# Patient Record
Sex: MALE | Race: WHITE
[De-identification: names, ages, dates, MRNs, and addresses within clinical notes are randomized per-mention and may not be internally consistent; named-entity substitution may affect disease eponyms.]

---

## 2021-11-18 ENCOUNTER — HOSPITAL ENCOUNTER (EMERGENCY)
Dept: HOSPITAL 97 - ER | Age: 81
Discharge: HOME | End: 2021-11-18
Payer: COMMERCIAL

## 2021-11-18 VITALS — DIASTOLIC BLOOD PRESSURE: 66 MMHG | OXYGEN SATURATION: 94 % | SYSTOLIC BLOOD PRESSURE: 128 MMHG | TEMPERATURE: 98.2 F

## 2021-11-18 DIAGNOSIS — F17.210: ICD-10-CM
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
## 2021-11-18 DIAGNOSIS — S42.202A: Primary | ICD-10-CM

## 2021-11-18 PROCEDURE — 99284 EMERGENCY DEPT VISIT MOD MDM: CPT

## 2021-11-18 NOTE — EDPHYS
Physician Documentation                                                                           

 [facility]                                                                 

Name: Rodney Xiao                                                                                

Age: 81 yrs                                                                                       

Sex: Male                                                                                         

: 1940                                                                                   

MRN: [id number]                                                                                   

Arrival Date: 2021                                                                          

Time: 09:52                                                                                       

Account#: [id number]                                                                            

Bed 20                                                                                            

Private MD:                                                                                       

ED Physician Poncho Lui                                                                       

HPI:                                                                                              

                                                                                             

10:52 This 81 yrs old Male presents to ER via EMS with complaints of Arm pain.                jr8 

10:52 This is an 81-year-old male patient that presented to the emergency room via EMS after  jr8 

      sustaining [place] fall on a cruise [place]. Patient was evaluated initially on the cruise ship      

      and had imaging done confirming that he did have a proximal humeral fracture. Was           

      splinted at that time and flown back to [place]. Patient came to emergency room today     

      for further evaluation..                                                                    

                                                                                                  

Historical:                                                                                       

- Allergies:                                                                                      

10:45 No Known Allergies;                                                                     sl2 

- PMHx:                                                                                           

10:45 Anxiety; CVA; Depression; Hyperlipidemia;                                               sl2 

                                                                                                  

- Immunization history:: Adult Immunizations up to date, Client reports receiving the             

  2nd dose of the Covid vaccine.                                                                  

- Social history:: Smoking status: Patient reports the use of cigarette tobacco                   

  products, smokes one-half pack cigarettes per day.                                              

                                                                                                  

                                                                                                  

ROS:                                                                                              

10:52 Eyes: Negative for injury, pain, redness, and discharge, ENT: Negative for injury,      jr8 

      pain, and discharge, Neck: Negative for injury, pain, and swelling, Cardiovascular:         

      Negative for chest pain, palpitations, and edema, Respiratory: Negative for shortness       

      of breath, cough, wheezing, and pleuritic chest pain, Abdomen/GI: Negative for              

      abdominal pain, nausea, vomiting, diarrhea, and constipation, Back: Negative for injury     

      and pain, Skin: Negative for injury, rash, and discoloration, Neuro: Negative for           

      headache, weakness, numbness, tingling, and seizure.                                        

10:52 MS/extremity: Positive for decreased range of motion, pain, tenderness, of the left arm.    

                                                                                                  

Exam:                                                                                             

10:52 Constitutional:  This is a well developed, well nourished patient who is awake, alert,  jr8 

      and in no acute distress. Head/Face:  Normocephalic, atraumatic. Neck:  Trachea             

      midline, no thyromegaly or masses palpated, and no cervical lymphadenopathy.  Supple,       

      full range of motion without nuchal rigidity, or vertebral point tenderness.  No            

      Meningismus. Chest/axilla:  Normal chest wall appearance and motion.  Nontender with no     

      deformity.  No lesions are appreciated. Cardiovascular:  Regular rate and rhythm with a     

      normal S1 and S2.  No gallops, murmurs, or rubs.  Normal PMI, no JVD.  No pulse             

      deficits. Respiratory:  Lungs have equal breath sounds bilaterally, clear to                

      auscultation and percussion.  No rales, rhonchi or wheezes noted.  No increased work of     

      breathing, no retractions or nasal flaring. Abdomen/GI:  Soft, non-tender, with normal      

      bowel sounds.  No distension or tympany.  No guarding or rebound.  No evidence of           

      tenderness throughout. Back:  No spinal tenderness.  No costovertebral tenderness.          

      Full range of motion. Skin:  Warm, dry with normal turgor.  Normal color with no            

      rashes, no lesions, and no evidence of cellulitis. Neuro:  Awake and alert, GCS 15,         

      oriented to person, place, time, and situation.  Cranial nerves II-XII grossly intact.      

      Motor strength 5/5 in all extremities.  Sensory grossly intact.                             

10:52 Musculoskeletal/extremity: Extremities: grossly normal except: noted in the left arm:       

      She has moderate tenderness to the proximal humeral region without obvious deformity.       

      No other external signs of trauma.  Patient remains in a coaptation splint.  Patient is     

      able to dorsiflex wrist and squeeze with his left hand.  Sensation intact throughout        

      with 2+ radial pulses on affected extremity..                                               

                                                                                                  

Vital Signs:                                                                                      

09:46  / 64; Pulse 74; Resp 18; Temp 98.1; Pulse Ox 88% on R/A;                         sl2 

11:00  / 76; Pulse 75; Resp 18; Temp 98.1; Pulse Ox 91% on 2 lpm NC;                    sl2 

12:00  / 66; Pulse 76; Resp 18; Temp 98.2; Pulse Ox 94% on 2 lpm NC;                    sl2 

                                                                                                  

MDM:                                                                                              

09:55 Patient medically screened.                                                             jr8 

10:52 Data reviewed: vital signs, nurses notes, radiologic studies, plain films. Data         jr8 

      interpreted: Pulse oximetry: on 2L(s) per nasal canula, is 96 %. Interpretation:            

      acceptable. Counseling: I had a detailed discussion with the patient and/or guardian        

      regarding: the historical points, exam findings, and any diagnostic results supporting      

      the discharge/admit diagnosis, radiology results, the need for outpatient follow up, a      

      orthopedic surgeon, to return to the emergency department if symptoms worsen or persist     

      or if there are any questions or concerns that arise at home. ED course: Discussed with     

      patient that he needs to remain in the coaptation splint. No focal deficits                 

      neurologically at this time to indicate need for reduction or immediate surgery. Will       

      refer him to orthopedics for further evaluation. Patient good with this at this time.       

      Patient knows to come back if he were to worsen at any point time..                         

                                                                                                  

                                                                                             

10:02 Order name: XRAY Shoulder LEFT 2 view; Complete Time: 11:23                             jr8 

                                                                                                  

Administered Medications:                                                                         

10:09 Drug: Norco (HYDROcodone-acetaminophen) (7.5 mg-325 mg) 1 tabs Route: PO;               sl2 

11:15 Follow up: Response: No adverse reaction; Pain is decreased                             sl2 

12:30 Drug: Norco (HYDROcodone-acetaminophen) (7.5 mg-325 mg) 1 tabs Route: PO;               sl2 

12:32 Follow up: Response: No adverse reaction                                                sl2 

                                                                                                  

                                                                                                  

Disposition:                                                                                      

17:24 Co-signature as Attending Physician, Poncho Lui MD I agree with the assessment and   kdr 

      plan of care.                                                                               

                                                                                                  

Disposition Summary:                                                                              

21 11:03                                                                                    

Discharge Ordered                                                                                 

      Location: Home                                                                          jr 

      Problem: new                                                                            jr8 

      Symptoms: have improved                                                                 jr8 

      Condition: Stable                                                                       jr8 

      Diagnosis                                                                                   

        - Fracture of upper end of humerus                                                    jr8 

      Followup:                                                                               jr8 

        - With: Stone Guzman MD                                                              

        - When: 2 - 3 days                                                                         

        - Reason: Recheck today's complaints, Continuance of care, Re-evaluation by your           

      physician                                                                                   

      Discharge Instructions:                                                                     

        - Discharge Summary Sheet                                                             jr8 

        - Humerus Fracture Treated With Immobilization                                        jr8 

      Forms:                                                                                      

        - Medication Reconciliation Form                                                      jr8 

        - Thank You Letter                                                                    jr8 

        - SBAR form                                                                           em1 

        - Antibiotic Education                                                                jr8 

        - Prescription Opioid Use                                                             jr8 

      Prescriptions:                                                                              

        - Tylenol-Codeine #3 300 mg-30 mg Oral                                                     

            - take 2 tablet by ORAL route every 8 hours As needed; 20 tablet; Refills: 0,     jr8 

      Product Selection Permitted                                                                 

Signatures:                                                                                       

Dispatcher [employer]                           EDMS                                                 

Poncho Lui MD MD kdr Roszak, Josh, PA PA   jr8                                                  

Clarissa Beaver RN                     RN   sl2                                                  

                                                                                                  

Corrections: (The following items were deleted from the chart)                                    

11:11 10:45 Immunization history: Adult Immunizations up to date, Client reports receiving    sl2 

      the 2nd dose of the Covid vaccine, sl2                                                      

 10:45 Social history: Smoking status: Patient reports the use of cigarette tobacco      sl2 

      products, smokes one-half pack cigarettes per day, sl2                                      

                                                                                                  

**************************************************************************************************

## 2021-11-18 NOTE — ER
Nurse's Notes                                                                                     

 [facility]                                                                 

Name: Rodney Xiao                                                                                

Age: 81 yrs                                                                                       

Sex: Male                                                                                         

: 1940                                                                                   

MRN: [id number]                                                                                   

Arrival Date: 2021                                                                          

Time: 09:52                                                                                       

Account#: [id number]                                                                            

Bed 20                                                                                            

Private MD:                                                                                       

Diagnosis: Fracture of upper end of humerus                                                       

                                                                                                  

Presentation:                                                                                     

                                                                                             

09:46 Chief complaint: Patient states: Left arm fracture - Patient presented to ED via [address] [facility]. Report received that patient fell from his motorized wheel chair unto his      

      left arm On  on 21 while on a cruise ship. Patient arrived home last     

      PM, c/o severe left arm pain. Sling in place. Presents to ED for evaluation and             

      orthopedic referral.                                                                        

09:46 Coronavirus screen: Vaccine status: Patient reports receiving the 2nd dose of the covid sl2 

      vaccine. Ebola Screen: Patient negative for fever greater than or equal to 101.5            

      degrees Fahrenheit, and additional compatible Ebola Virus Disease symptoms Patient          

      denies exposure to infectious person. Patient denies travel to an Ebola-affected area       

      in the 21 days before illness onset. No symptoms or risks identified at this time.          

      Initial Sepsis Screen: Does the patient meet any 2 criteria? No. Patient's initial          

      sepsis screen is negative. Does the patient have a suspected source of infection? No.       

      Patient's initial sepsis screen is negative. Risk Assessment: Do you want to hurt           

      yourself or someone else? Patient reports no desire to harm self or others. Onset of        

      symptoms was 2001.                                                             

09:46 Method Of Arrival: EMS: [place] [address] 

09:46 Acuity: DIANA 2                                                                           2 

                                                                                                  

Triage Assessment:                                                                                

10:45 General: Appears uncomfortable, unkempt, Behavior is calm, cooperative, appropriate for [facility] 

      age, Reports Left arm pain. Pain: Complains of pain in left arm Pain currently is 8 out     

      of 10 on a pain scale. at worst was 10 out of 10 on a pain scale. level that patient        

      reports is acceptable is 3 out of 10 on a pain scale. Quality of pain is described as       

      aching, sharp, piercing, Pain began suddenly, 4 days ago Alleviated by medications,         

      Aggravated by increased activity, repositioning. EENT: No deficits noted. No signs          

      and/or symptoms were reported regarding the EENT system. Neuro: No deficits noted.          

      Level of Consciousness is awake, alert, obeys commands, Oriented to person, place,          

      time, situation, Appropriate for age. Cardiovascular: No deficits noted. Reports.           

      Respiratory: No deficits noted. Reports Airway is patent Trachea midline Respiratory        

      effort is even, unlabored, Respiratory pattern is regular, symmetrical. GI: No deficits     

      noted. No signs and/or symptoms were reported involving the gastrointestinal system.        

      : No deficits noted. No signs and/or symptoms were reported regarding the                 

      genitourinary system. Derm: No deficits noted. No signs and/or symptoms reported            

      regarding the dermatologic system. Musculoskeletal: Reports pain in left arm.               

                                                                                                  

Historical:                                                                                       

- Allergies:                                                                                      

10:45 No Known Allergies;                                                                     sl2 

- PMHx:                                                                                           

10:45 Anxiety; CVA; Depression; Hyperlipidemia;                                               sl2 

                                                                                                  

- Immunization history:: Adult Immunizations up to date, Client reports receiving the             

  2nd dose of the Covid vaccine.                                                                  

- Social history:: Smoking status: Patient reports the use of cigarette tobacco                   

  products, smokes one-half pack cigarettes per day.                                              

                                                                                                  

                                                                                                  

Screening:                                                                                        

10:45 Abuse screen: Denies threats or abuse. Denies injuries from another.                    sl2 

10:45 Nutritional screening: No deficits noted. Tuberculosis screening: No symptoms or risk   sl2 

      factors identified. Never had TB. Possible symptoms: None Risk factors: None. Fall Risk     

      None identified. No fall in past 12 months (0 pts). Secondary diagnosis (15 points)         

      impaired mobility, CVA, No IV (0 pts). Ambulatory Aid- Crutches/Cane/Walker (15 pts).       

      Gait- Impaired (20 pts.). Mental Status- Oriented to own ability (0 pts). Total Dempsey       

      Fall Scale indicates High Risk Score (45 or more points).                                   

10:45 Fall Risk Total Dempsey Fall Scale indicates High Risk Score (45 or more points). Fall    sl2 

      prevention measures have been instituted. Side Rails Up X 2 Placed Close to Nursing         

      Station Frequent Obs/Assessments Occuring.                                                  

                                                                                                  

Vital Signs:                                                                                      

09:46  / 64; Pulse 74; Resp 18; Temp 98.1; Pulse Ox 88% on R/A;                         sl2 

11:00  / 76; Pulse 75; Resp 18; Temp 98.1; Pulse Ox 91% on 2 lpm NC;                    sl2 

12:00  / 66; Pulse 76; Resp 18; Temp 98.2; Pulse Ox 94% on 2 lpm NC;                    sl2 

                                                                                                  

ED Course:                                                                                        

09:52 Patient arrived in ED.                                                                  em1 

09:55 Abelardo Cunningham PA is PHCP.                                                               [id number] 

09:55 Poncho Lui MD is Attending Physician.                                              [email or web address] 

10:09 Clarissa Beaver, RN is Primary Nurse.                                                   sl2 

10:45 Triage completed.                                                                       sl2 

10:45 Arm band placed on right wrist.                                                         sl2 

10:45 Patient has correct armband on for positive identification. Bed in low position. Call   sl2 

      light in reach. Side rails up X2.                                                           

10:45 No provider procedures requiring assistance completed. Patient did not have IV access   sl2 

      during this emergency room visit.                                                           

10:56 XRAY Shoulder LEFT 2 view In Process Unspecified.                                       EDMS

11:03 Stone Guzman MD is Referral Physician.                                            [id number] 

                                                                                                  

Administered Medications:                                                                         

10:09 Drug: Norco (HYDROcodone-acetaminophen) (7.5 mg-325 mg) 1 tabs Route: PO;               sl2 

11:15 Follow up: Response: No adverse reaction; Pain is decreased                             sl2 

12:30 Drug: Norco (HYDROcodone-acetaminophen) (7.5 mg-325 mg) 1 tabs Route: PO;               sl2 

12:32 Follow up: Response: No adverse reaction                                                sl2 

                                                                                                  

                                                                                                  

Outcome:                                                                                          

11:03 Discharge ordered by MD.                                                                [email or web address] 

12:31 Discharged to home via ambulance.                                                       sl2 

12:31 Condition: good                                                                             

12:31 Condition: stable                                                                           

12:31 Discharge instructions given to patient, significant other, Instructed on discharge         

      instructions, follow up and referral plans. no drinking with medication, medication         

      usage, Demonstrated understanding of instructions, follow-up care, medications,             

      Prescriptions given X 1.                                                                    

12:32 Patient left the ED.                                                                    sl2 

                                                                                                  

Signatures:                                                                                       

Dispatcher [employer]                           EDMS                                                 

Scott Dietz                               em1                                                  

Abelardo Cunningham PA                        PA   [id number]                                                  

Clarissa Beaver, RN                     RN   sl2                                                  

                                                                                                  

Corrections: (The following items were deleted from the chart)                                    

11:11 10:45 Immunization history: Adult Immunizations up to date, Client reports receiving    sl2 

      the 2nd dose of the Covid vaccine, sl2                                                      

11:11 10:45 Social history: Smoking status: Patient reports the use of cigarette tobacco      sl2 

      products, smokes one-half pack cigarettes per day, sl2                                      

11:12 10:45 General: Appears uncomfortable, unkempt, Behavior is calm, cooperative,           sl2 

      appropriate for age, Reports Left arm pain sl2                                              

11:12 10:45 Pain: Complains of pain in left arm Pain currently is 8 out of 10 on a pain       sl2 

      scale. at worst was 10 out of 10 on a pain scale. level that patient reports is             

      acceptable is 3 out of 10 on a pain scale. Quality of pain is described as aching,          

      sharp, piercing, Pain began suddenly, 4 days ago Alleviated by medications, Aggravated      

      by increased activity, repositioning, sl2                                                   

                                                                                                  

**************************************************************************************************

## 2021-11-18 NOTE — RAD REPORT
EXAM DESCRIPTION:  RAD - Shoulder  Left 2 View - 11/18/2021 10:56 am

 

CLINICAL HISTORY:  DEFORMITY

 

COMPARISON:  No comparisons

 

FINDINGS:  Oblique fracture is seen of the proximal aspect of the left humerus. The bones are deminer
alized. No significant bony bridging is evident. Mild callus formation is seen along the fracture sit
e medially.

## 2022-10-23 ENCOUNTER — HOSPITAL ENCOUNTER (EMERGENCY)
Dept: HOSPITAL 97 - ER | Age: 82
Discharge: ANOTHER HOSPITAL | End: 2022-10-23
Payer: COMMERCIAL

## 2022-10-23 VITALS — OXYGEN SATURATION: 100 %

## 2022-10-23 VITALS — SYSTOLIC BLOOD PRESSURE: 111 MMHG | DIASTOLIC BLOOD PRESSURE: 46 MMHG

## 2022-10-23 VITALS — TEMPERATURE: 97.1 F

## 2022-10-23 DIAGNOSIS — S42.332G: Primary | ICD-10-CM

## 2022-10-23 DIAGNOSIS — Z86.73: ICD-10-CM

## 2022-10-23 DIAGNOSIS — Z23: ICD-10-CM

## 2022-10-23 LAB
BUN BLD-MCNC: 25 MG/DL (ref 7–18)
GLUCOSE SERPLBLD-MCNC: 125 MG/DL (ref 74–106)
HCT VFR BLD CALC: 28.4 % (ref 39.6–49)
INR BLD: 1.49
LYMPHOCYTES # SPEC AUTO: 1.7 K/UL (ref 0.7–4.9)
MCV RBC: 95.9 FL (ref 80–100)
PMV BLD: 9.1 FL (ref 7.6–11.3)
POTASSIUM SERPL-SCNC: 3.9 MMOL/L (ref 3.5–5.1)
RBC # BLD: 2.97 M/UL (ref 4.33–5.43)

## 2022-10-23 PROCEDURE — 99285 EMERGENCY DEPT VISIT HI MDM: CPT

## 2022-10-23 PROCEDURE — 29105 APPLICATION LONG ARM SPLINT: CPT

## 2022-10-23 PROCEDURE — 80048 BASIC METABOLIC PNL TOTAL CA: CPT

## 2022-10-23 PROCEDURE — 73060 X-RAY EXAM OF HUMERUS: CPT

## 2022-10-23 PROCEDURE — 90471 IMMUNIZATION ADMIN: CPT

## 2022-10-23 PROCEDURE — 85025 COMPLETE CBC W/AUTO DIFF WBC: CPT

## 2022-10-23 PROCEDURE — 87811 SARS-COV-2 COVID19 W/OPTIC: CPT

## 2022-10-23 PROCEDURE — 86850 RBC ANTIBODY SCREEN: CPT

## 2022-10-23 PROCEDURE — 36415 COLL VENOUS BLD VENIPUNCTURE: CPT

## 2022-10-23 PROCEDURE — 2W3BX1Z IMMOBILIZATION OF LEFT UPPER ARM USING SPLINT: ICD-10-PCS

## 2022-10-23 PROCEDURE — 96375 TX/PRO/DX INJ NEW DRUG ADDON: CPT

## 2022-10-23 PROCEDURE — 96361 HYDRATE IV INFUSION ADD-ON: CPT

## 2022-10-23 PROCEDURE — 90714 TD VACC NO PRESV 7 YRS+ IM: CPT

## 2022-10-23 PROCEDURE — 86900 BLOOD TYPING SEROLOGIC ABO: CPT

## 2022-10-23 PROCEDURE — 85730 THROMBOPLASTIN TIME PARTIAL: CPT

## 2022-10-23 PROCEDURE — 85610 PROTHROMBIN TIME: CPT

## 2022-10-23 PROCEDURE — 86901 BLOOD TYPING SEROLOGIC RH(D): CPT

## 2022-10-23 PROCEDURE — 96365 THER/PROPH/DIAG IV INF INIT: CPT

## 2022-10-23 NOTE — EDPHYS
Physician Documentation                                                                           

 [facility]                                                                 

Name: Rodney Xiao                                                                                

Age: 82 yrs                                                                                       

Sex: Male                                                                                         

: 1940                                                                                   

MRN: [id number]                                                                                   

Arrival Date: 10/23/2022                                                                          

Time: 16:16                                                                                       

Account#: [id number]                                                                            

Bed 7                                                                                             

Private MD:                                                                                       

ED Physician Berlin Cueva                                                                         

HPI:                                                                                              

10/23                                                                                             

17:22 This 82 yrs old Male presents to ER via EMS with complaints of open fracture.           rn  

17:22 The patient or guardian complains of decreased range of motion, deformity, injury,      rn  

      pain. The complaints affect the left bicep. Onset: The symptoms/episode began/occurred      

      at an unknown time. Modifying factors: The symptoms are alleviated by nothing. the          

      symptoms are aggravated by movement. Severity of symptoms: At their worst the symptoms      

      were moderate, in the emergency department the symptoms are unchanged. The patient has      

      not experienced similar symptoms in the past. The patient has not recently seen a           

      physician. EMS reports recent humerus fracture 3 weeks ago, non-operative treatment         

      since then. Something happened at nursing home today, nursing home nurse noticed blood      

      in dressing/splint, opened it up and now increased deformity with open wound near           

      fracture site. Clots found outside of wound. Nursing home does not know how it              

      happened. .                                                                                 

                                                                                                  

Historical:                                                                                       

- Allergies:                                                                                      

17:06 No Known Allergies;                                                                     ph  

- PMHx:                                                                                           

17:06 Anxiety; CVA; Depression; Hyperlipidemia;                                               ph  

                                                                                                  

- Immunization history:: Adult Immunizations unknown.                                             

- Social history:: Smoking status: unknown.                                                       

- Family history:: not pertinent.                                                                 

- Hospitalizations: : No recent hospitalization is reported.                                      

                                                                                                  

                                                                                                  

ROS:                                                                                              

17:22 Unable to obtain ROS due to baseline stroke.                                            rn  

                                                                                                  

Exam:                                                                                             

17:22 Constitutional:  This is a well developed, well nourished patient who is awake, alert,  rn  

      and in no acute distress. Head/Face:  Normocephalic, atraumatic. Cardiovascular:            

      Regular rate and rhythm.  No pulse deficits. Respiratory:  No increased work of             

      breathing, no retractions or nasal flaring. Abdomen/GI:  Soft, non-tender Skin:  Warm,      

      dry MS/ Extremity:  Pulses equal, no cyanosis.  + severe deformity of LUE, 3 cm open        

      wound just distal to palpable bony fragment of left humerus. Ipsilateral pulses strong.     

      + ecchymosis and hematoma to left proximal forearm.  Neuro:  Awake and alert, GCS 15        

                                                                                                  

Vital Signs:                                                                                      

16:20  / 48; Pulse 67; Resp 18; Temp 97.6; Pulse Ox 95% on 2 lpm NC;                    ph  

16:22  / 57; Pulse 69; Resp 21; Pulse Ox 96% on 2 lpm NC;                               ld1 

17:32  / 55; Pulse 63; Resp 18; Pulse Ox 97% on 2 lpm NC;                               ld1 

18:01  / 55; Pulse 64; Resp 18; Pulse Ox 100% on 2 lpm NC;                              ph  

18:30  / 50; Pulse 69; Resp 18; Temp 97.1; Pulse Ox 100% on R/A;                        ph  

20:00  / 46; Pulse 62; Resp 17 S; Pulse Ox 100% on 2 lpm NC;                            aa9 

                                                                                                  

MDM:                                                                                              

16:18 Patient medically screened.                                                             rn  

17:20 ED course: Consulted with Dr. Maier, states does not do open fractures or distal humerus rn  

      fractures, requests patient transferred to [facility]. .                         

17:30 Differential diagnosis: open fracture. Data reviewed: vital signs, nurses notes, lab    rn  

      test result(s), radiologic studies.                                                         

17:30 Counseling: I had a detailed discussion with the patient and/or guardian regarding: the rn  

      historical points, exam findings, and any diagnostic results supporting the                 

      discharge/admit diagnosis, radiology results, the need to transfer to another facility,     

      for higher level of care, [facility] does not immediately have the        

      required specialist. Response to treatment: the patient's symptoms have mildly improved     

      after treatment, and as a result, I will admit patient. Admission orders: after a           

      detailed discussion of the patient's condition and case, the admit orders are written       

      by me.                                                                                      

17:52 ED course: Wound cleaned, xeroform placed on wound, pt re-splinted. 2+ radial pulses    rn  

      after splinting. Pt seems much more comfortable. .                                          

17:53 ED course: Still waiting on transfer call-back.                                         rn  

18:52 ED course: Pulses reevaluated again, strong left radial pulse in current splint..       rn  

                                                                                                  

10/23                                                                                             

16:19 Order name: CBC with Diff; Complete Time: 17:59                                         rn  

10/23                                                                                             

16:19 Order name: Basic Metabolic Panel                                                       rn  

10/23                                                                                             

16:19 Order name: Protime (+inr)                                                              rn  

10/23                                                                                             

16:19 Order name: Ptt, Activated                                                              rn  

10/23                                                                                             

17:18 Order name: Type And Screen                                                             rn  

10/23                                                                                             

17:23 Order name: SARS RAPID                                                                  iw  

10/23                                                                                             

16:19 Order name: IV Start; Complete Time: 17:29                                              rn  

10/23                                                                                             

16:19 Order name: XRAY Humerus LEFT; Complete Time: 17:59                                     rn  

                                                                                                  

Administered Medications:                                                                         

17:31 Drug: NS 0.9% 1000 ml Route: IV; Rate: 1000 ml; Site: right forearm;                    ld1 

18:59 Follow up: Response: No adverse reaction; IV Status: Completed infusion; IV Intake:     ph  

      1000ml                                                                                      

17:35 Drug: Ancef (cefazolin) 2 grams Route: IVPB; Infused Over: 30 mins; Site: right         ph  

      antecubital;                                                                                

18:13 Follow up: Response: No adverse reaction; IV Status: Completed infusion                 ph  

17:37 Drug: morphine 2 mg Route: IVP; Infused Over: 4 mins; Site: right antecubital;          ph  

18:59 Follow up: Response: No adverse reaction                                                ph  

17:58 Drug: Zofran (Ondansetron) 4 mg Route: IVP; Site: right antecubital;                    ph  

18:59 Follow up: Response: No adverse reaction                                                ph  

18:50 Drug: Tetanus Toxoid,Adsorbed 0.5 ml {: [employer]. Exp: 2024. Lot ph  

      #: [id number]. } Route: IM; Site: right deltoid;                                                 

18:59 Follow up: Response: No adverse reaction                                                ph  

                                                                                                  

                                                                                                  

Disposition Summary:                                                                              

10/23/22 17:31                                                                                    

Transfer Ordered                                                                                  

      Transfer Location: [facility]                                              rn  

      Reason: Higher level of care                                                            rn  

      Condition: Stable                                                                       rn  

      Problem: new                                                                            rn  

      Symptoms: have improved                                                                 rn  

      Accepting Physician: Dr. OMAR [facility](10/23/22 20:13)               aa9 

      Diagnosis                                                                                   

        - Displaced oblique fracture of shaft of humerus, left arm, initial encounter for     rn  

      open fracture                                                                               

      Forms:                                                                                      

        - Medication Reconciliation Form                                                      rn  

        - SBAR form                                                                           rn  

Signatures:                                                                                       

Dispatcher [employer]                           EDBerlin Diaz MD MD rn Hall, Patricia, RN RN ph Botello, Elizabeth eb Dibbern, Lauren, RN                     RN   ld1                                                  

Emely Major RN                       RN   aa9                                                  

                                                                                                  

Corrections: (The following items were deleted from the chart)                                    

17:27 17:22 Unable to obtain ROS due to baseline dementia, rn                                 rn  

18:30 17:31 Dr. yamilex golden  

20:13 18:30 Dr. OMAR Garcia [facility] ER eb                                           aa9 

                                                                                                  

**************************************************************************************************

## 2022-10-23 NOTE — XMS REPORT
Continuity of Care Document

                           Created on:2022



Patient:DAVI SANTOS

Sex:Male

:1940

External Reference #:[id number]





Demographics







                          Address                   [address] [place] [address]

 

                          Home Phone                [phone]

 

                          Mobile Phone              [phone]

 

                          Email Address             NONE

 

                          Preferred Language        English

 

                          Marital Status            Unknown

 

                          Scientologist Affiliation     Unknown

 

                          Race                      Unknown

 

                          Additional Race(s)        White

 

                          Ethnic Group              Unknown









Author







                          Organization              [facility]

t

 

                          Address                   [address]



                                                    [place] [address]

 

                          Phone                     [phone]









Support







                Name            Relationship    Address         Phone

 

                JAKUB SANTOS               [address] [facility]    [phone]



                                                [place] [address] 

 

                Kaitlin Mcknight      Child           [address]  [phone]



                                                [place] [address] 

 

                DAVI SANTOS               Unavailable     Unavailable

 

                G Davi Santos           Unavailable     [phone]









Care Team Providers







                    Name                Role                Phone

 

                    Pcp, Patient Does Not Have A Primary Care Physician [phone]



 

                    ALESHA LAWRENCE    Attending Clinician Unavailable

 

                    El Small RN Attending Clinician Unavailable

 

                    EDGAR BYERS      Attending Clinician Unavailable

 

                    Yi Woodard DO Attending Clinician [phone]

 

                    Graham Mina MD  Attending Clinician [phone]

 

                    Edgar Byers MD   Attending Clinician [phone]

 

                    Idalmis Waddell MA Attending Clinician Unavailable

 

                    Armand Solorio MD    Attending Clinician [phone]

 

                    Wanda Martinez Attending Clinician Unavailable

 

                    Helder Easton MD Attending Clinician [phone]

 

                    Lab, Adc Fam Pob I  Attending Clinician Unavailable

 

                    Anette Delgado Attending Clinician [phone]

 

                    ANETTE TORRES   Attending Clinician Unavailable

 

                    Doctor Unassigned, No Name Attending Clinician Unavailable

 

                    EDGAR BYERS      Admitting Clinician Unavailable

 

                    Edgar Byers MD   Admitting Clinician [phone]









Payers







           Payer Name Policy Type Policy Number Effective Date Expiration Date S

[facility]

 

           [place] PART A \T\            [id number] 2004            



           B                                00:00:00              

 

           OTILIA [id number] 2018            



           BENEFITS                         00:00:00              

 

           MEDICARE A B            [id number] 2004            



                                            00:00:00              







Problems







       Condition Condition Condition Status Onset  Resolution Last   Treating Co

mments 

Source



       Name   Details Category        Date   Date   Treatment Clinician        



                                                 Date                 

 

       Paroxysmal Paroxysmal Disease Active 2022                             U

nivers



       atrial atrial               0-16                               ity of



       fibrillati fibrillati               00:00:                             [facility]

[place]



       on with on with               00                                 Medical



       RVR    RVR                                                     Branch

 

       E44.0  E44.0  Disease Active 2022                             [facility]



       Moderate Moderate               0-14                               ity of



       protein protein               00:00:                             [place]



       calorie calorie               00                                 Medical



       malnutriti malnutriti                                                  Br

anch



       on     on                                                      

 

       COPD   COPD   Disease Active 2022                             [facility]



       exacerbati exacerbati               0-10                               it

y of



       on     on                   00:00:                             [place]



                                   00                                 Medical



                                                                      Branch

 

       UTI    UTI    Disease Active 2022                             [facility]



       (urinary (urinary               0-10                               ity of



       tract  tract                00:00:                             [place]



       infection) infection)               00                                 Me

dical



                                                                      Branch

 

       Troponin I Troponin I Disease Active 2022                             U

nivers



       above  above                0-10                               ity of



       reference reference               00:00:                             [place]

[facility]



       range  range                00                                 Medical



                                                                      Branch

 

       Acute on Acute on Disease Active 2022                             Unive

rs



       chronic chronic               0-10                               ity of



       diastolic diastolic               00:00:                             [place]

[place]



       congestive congestive               00                                 Me

dical



       heart  heart                                                   Branch



       failure failure                                                  

 

       JAZ (acute JAZ (acute Disease Active 2022                             U

nivers



       kidney kidney               0-10                               ity of



       injury) injury)               00:00:                             [place]



                                   00                                 Medical



                                                                      Branch

 

       Hypercapni Hypercapni Disease Active 2022                             U

nivers



       a      a                    0-09                               ity of



                                   00:00:                             [place]



                                   00                                 Medical



                                                                      Branch

 

       Closed Closed Disease Active 2021                             Methodi



       fracture fracture               1-24                               st



       of left of left               00:00:                             [facility]



       proximal proximal               00                                 l



       humerus humerus                                                  

 

       Influenza Influenza Disease Active                              Uni

vers



                                   1-30                               ity of



                                   00:00:                             [place]



                                   00                                 Medical



                                                                      Branch

 

       Acute  Acute  Disease Active                              [facility]



       respirator respirator               1-30                               it

y of



       y failure y failure               00:00:                             [place]

[facility]



       with   with                 00                                 Medical



       hypoxia hypoxia                                                  Branch

 

       Shortness Shortness Disease Active                              Uni

vers



       of breath of breath               1-25                               ity 

of



                                   00:00:                             [place]



                                   00                                 Medical



                                                                      Branch

 

       S/P hip S/P hip Disease Active                              [facility]



       hemiarthro hemiarthro               1-02                               it

y of



       plasty plasty               00:00:                             [place]



                                   00                                 [facility]

 

       Anemia, Anemia, Disease Active 2016                             [facility]



       posthemorr posthemorr                                              it

y of



       drew russell,               00:00:                             [place]



       acute  acute                                                 [facility]

 

       Urinary Urinary Disease Active 2016                             [facility]



       retention retention                                              ity 

of



                                   00:00:                             [place]



                                   00                                 Medical



                                                                      Branch

 

       Sundowning Sundowning Disease Active 2016                             U

nivers



                                                                  ity of



                                   00:00:                             [place]



                                                                    [facility]

 

       Hip    Hip    Disease Active 2016                             [facility]



       fracture fracture                                              ity of



                                   00:00:                             [place]



                                   00                                 [facility]







Allergies, Adverse Reactions, Alerts







       Allergy Allergy Status Severity Reaction(s) Onset  Inactive Treating Comm

ents 

Source



       Name   Type                        Date   Date   Clinician        

 

       NO KNOWN Drug   Active                                           [facility]



       ALLERGIE Class                                                   ity of



       S                                                              [facility]







Social History







           Social Habit Start Date Stop Date  Quantity   Comments   Source

 

           History of                       Cigarette Smoker            Universi

ty of



           tobacco use                                             [facility]

 

           Exposure to 2022 2022-10-09 Unable to assess            Univers

ity of



           SARS-CoV-2 00:00:00   09:43:00                         [facility]



           (event)                                                [place]

 

           Alcohol intake 2022-10-09 2022-10-09 Ex-drinker            University

 



                      00:00:00   00:00:00   (finding)             [facility]

 

           Tobacco use and 2022-10-09 2022-10-09 Smokeless tobacco            Un

iversity of



           exposure   00:00:00   00:00:00   non-user              [facility]

 

           Tobacco Comment 2022-10-09 2022-10-09 Last cigarette 1            Uni

versity of



                      00:00:00   00:00:00   week ago due            [place] [facility]

l



                                            being sick            Branch

 

           Sex Assigned At 1940                       [facility]



           Birth      00:00:00   00:00:00                         









                Smoking Status  Start Date      Stop Date       Source

 

                Tobacco smoking consumption                                 [facility]



                unknown                                         

 

                Smokes tobacco daily 2022-10-09 00:00:00                 Univers

ity of [facility]







Medications







       Ordered Filled Start  Stop   Current Ordering Indication Dosage Frequency

 Signature

                    Comments            Components          Source



     Medication Medication Date Date Medication? Clinician                (SIG) 

          



     Name Name                                                   

 

     furosemide      2022- Yes       [id number] 20mg      Take 1          

 Univers



     20 mg      0-20 11-20                          tablet by           ity of



     tablet      00:00: 05:59                          mouth in           [place]



               00   :00                           the            Medical



                                                  [facility]



                                                  for 30           



                                                  days.           

 

     spironolact      2022- Yes       [id number] 25mg      Take 1          

 Univers



     one 25 mg      0-20 11-20                          tablet by           ity 

of



     tablet      00:00: 05:59                          mouth in           [place]



               00   :00                           the            Medical



                                                  morning           Branch



                                                  for 30           



                                                  days.           

 

     furosemide      2022- Yes       [id number] 20mg      Take 1          

 [facility]



     20 mg      0-20 11-20                          tablet by           ity of



     tablet      00:00: 05:59                          mouth in           [place]



               00   :00                           the            Medical



                                                  morning           Branch



                                                  for 30           



                                                  days.           

 

     spironolact      2022- Yes       [id number] 25mg      Take 1          

 Univers



     one 25 mg      0-20 11-20                          tablet by           ity 

of



     tablet      00:00: 05:59                          mouth in           [place]



               00   :00                           the            Medical



                                                  morning           Branch



                                                  for 30           



                                                  days.           

 

     Coenzyme      2022      Yes            1{capsu      Take 1           Univ

ers



     Q10 200 mg      0-19                     le}       capsule by           ity

 of



     Cap       16:11:                               mouth           [place]



               53                                 daily.           Medical



                                                                 Branch

 

     foLIC acid      2022      Yes            1mg       Take 1 mg           Un

tanvi



     1 mg tablet      0-19                               by mouth           ity 

of



               16:11:                               in the           [place]



               53                                 morning.           Medical



                                                                 Branch

 

     levalbutero      2022      Yes            1{puff}      Inhale 1          

 Univers



     l 45      0-19                               Puff every           ity of



     mcg/actuati      16:11:                               4 (four)           Te

xas



     on inhaler      53                                 hours as           Medic

al



                                                  needed for           Branch



                                                  Wheezing.           

 

     Vitamin      2022      Yes            1000ug      Take 1,000           Un

tanvi



     B-12 1,000      0-19                               mcg by           ity of



     mcg tablet      16:11:                               mouth in           [facility]

as



               53                                 the            Medical



                                                  morning.           Branch

 

     Coenzyme      2022      Yes            1{capsu      Take 1           Univ

ers



     Q10 200 mg      0-19                     le}       capsule by           ity

 of



     Cap       16:11:                               mouth           [place]



               53                                 daily.           Medical



                                                                 Branch

 

     foLIC acid      2022      Yes            1mg       Take 1 mg           Un

tanvi



     1 mg tablet      0-19                               by mouth           ity 

of



               16:11:                               in the           [place]



               53                                 morning.           Medical



                                                                 Branch

 

     levalbutero      2022      Yes            1{puff}      Inhale 1          

 Univers



     l 45      0-19                               Puff every           ity of



     mcg/actuati      16:11:                               4 (four)           Te

xas



     on inhaler      53                                 hours as           Medic

al



                                                  needed for           Branch



                                                  Wheezing.           

 

     Vitamin      2022      Yes            1000ug      Take 1,000           Un

tanvi



     B-12 1,000      0-19                               mcg by           ity of



     mcg tablet      16:11:                               mouth in           [facility]

as



               53                                 the            Medical



                                                  morning.           Branch

 

     HYDROmorpho      2022      Yes       [id number] 1mg       Take 0.5           Un

tanvi



     ne 2 mg      0-19                               tablets by           ity of



     tablet      00:00:                               mouth           [place]



               00                                 every 8           Medical



                                                  (eight)           Branch



                                                  hours.           



                                                  Indication           



                                                  s: acute           



                                                  pain           

 

     ipratropium      2022      Yes            3mL       Inhale 3           Un

tanvi



     -albuteroL      0-19                               mL every 4           ity

 of



     0.5 mg-3      00:00:                               (four)           [place]



     mg(2.5 mg      00                                 hours.           Medical



     base)/3 mL                                                        Branch



     nebulizer                                                        



     solution                                                        

 

     polyethylen      2022      Yes       [id number] 17g       Take 1           

Univers



     e glycol      0-19                               Packet by           ity of



     3350 17      00:00:                               mouth in           [place]



     gram powder      00                                 the            Medical



                                                  morning           Branch



                                                  and 1           



                                                  Packet in           



                                                  the            



                                                  evening.           

 

     sennosides-      2022      Yes       [id number] 1{tbl}      Take 1         

  [facility]



     docusate      0-19                               tablet by           ity of



     sodium      00:00:                               mouth in           [place]



     8.6-50 mg      00                                 the            Medical



     per tablet                                         morning           Branch



                                                  and 1           



                                                  tablet in           



                                                  the            



                                                  evening.           

 

     traMADoL      2022      Yes       [id number] 100mg      Take 100           Univ

ers



     100 mg Tab      0-19                               mg by           ity of



               00:00:                               mouth           [place]



               00                                 every 8           Medical



                                                  (eight)           Branch



                                                  hours.           



                                                  Indication           



                                                  s: acute           



                                                  pain           

 

     traZODone      2022      Yes       [id number] 50mg      Take 1           U

nivers



     50 mg      0-19                               tablet by           ity of



     tablet      00:00:                               mouth 3           [place]



               00                                 (three)           Medical



                                                  times           Branch



                                                  daily as           



                                                  needed for           



                                                  Insomnia           



                                                  (agitation           



                                                  ).             

 

     nicotine 21      2022      Yes       [id number] 1{patch      Apply 1       

    [facility]



     mg/24 hr      0-19                     }         Patch to           ity of



     patch      00:00:                               area(s)           [place]



               00                                 every 24           Medical



                                                  (twenty-fo           Branch



                                                  ur) hours.           

 

     HYDROmorpho      2022      Yes       [id number] 1mg       Take 0.5           Un

tanvi



     ne 2 mg      0-19                               tablets by           ity of



     tablet      00:00:                               mouth           [place]



               00                                 every 8           Medical



                                                  (eight)           Branch



                                                  hours.           



                                                  Indication           



                                                  s: acute           



                                                  pain           

 

     ipratropium      2022      Yes            3mL       Inhale 3           Un

[facility]



     -albuteroL      0-19                               mL every 4           ity

 of



     0.5 mg-3      00:00:                               (four)           [place]



     mg(2.5 mg      00                                 hours.           Medical



     base)/3 mL                                                        [facility]



     nebulizer                                                        



     solution                                                        

 

     polyethylen      2022      Yes       [id number] 17g       Take 1           

[facility]



     e glycol      0-19                               Packet by           ity of



     3350 17      00:00:                               mouth in           [place]



     gram powder      00                                 the            Medical



                                                  morning           [facility]



                                                  and 1           



                                                  Packet in           



                                                  the            



                                                  evening.           

 

     sennosides-      2022      Yes       [id number] 1{tbl}      Take 1         

  [facility]



     docusate      0-19                               tablet by           ity of



     sodium      00:00:                               mouth in           [place]



     8.6-50 mg      00                                 the            Medical



     per tablet                                         morning           [facility]



                                                  and 1           



                                                  tablet in           



                                                  the            



                                                  evening.           

 

     traMADoL      2022      Yes       [id number] 100mg      Take 100           [facility]

ers



     100 mg Tab      0-19                               mg by           ity of



               00:00:                               mouth           [place]



               00                                 every 8           Medical



                                                  (eight)           [facility]



                                                  hours.           



                                                  Indication           



                                                  s: acute           



                                                  pain           

 

     traZODone      2022      Yes       [id number] 50mg      Take 1           U

nivers



     50 mg      0-19                               tablet by           ity of



     tablet      00:00:                               mouth 3           [place]



               00                                 (three)           Medical



                                                  times           [facility]



                                                  daily as           



                                                  needed for           



                                                  Insomnia           



                                                  (agitation           



                                                  ).             

 

     nicotine 21      2022      Yes       [id number] 1{patch      Apply 1       

    [facility]



     mg/24 hr      0-19                     }         Patch to           ity of



     patch      00:00:                               area(s)           [place]



               00                                 every 24           Medical



                                                  (twenty-fo           [facility]



                                                  ur) hours.           

 

     SERTraline      2022- Yes       [id number] 50mg      Take 1          

 [facility]



     50 mg      0-19 11-19                          tablet by           ity of



     tablet      00:00: 05:59                          mouth in           [place]



               00   :00                           the            Medical



                                                  morning           [facility]



                                                  for 30           



                                                  days.           

 

     apixaban 5      2022- Yes       [id number] 5mg       Take 1           [facility]

ers



     mg tablet      0-19 11-19                          tablet by           ity 

of



               00:00: 05:59                          mouth in           [place]



               00   :00                           the            Medical



                                                  morning           [facility]



                                                  and 1           



                                                  tablet in           



                                                  the            



                                                  evening.           



                                                  Do all           



                                                  this for           



                                                  30 days.           



                                                  Indication           



                                                  s: atrial           



                                                  fibrillati           



                                                  on             

 

     metoprolol      2022- Yes       [id number] 12.5mg      Take 0.5      

     [facility]



     tartrate 25      0-19 11-19                          tablets by           i

ty of



     mg tablet      00:00: 05:59                          mouth in           [facility]

as



               00   :00                           the            Medical



                                                  morning           [facility]



                                                  and 0.5           



                                                  tablets in           



                                                  the            



                                                  evening.           



                                                  Do all           



                                                  this for           



                                                  30 days.           

 

     QUEtiapine      2022- Yes       [id number] 25mg      Take 1          

 [facility]



     25 mg      0-19 11-19                          tablet by           ity of



     tablet      00:00: 05:59                          mouth at           [place]



               00   :00                           bedtime           Medical



                                                  for 30           [facility]



                                                  days.           

 

     SERTraline      2022- Yes       [id number] 50mg      Take 1          

 [facility]



     50 mg      0-19 11-19                          tablet by           ity of



     tablet      00:00: 05:59                          mouth in           [place]



               00   :00                           the            Medical



                                                  morning           [facility]



                                                  for 30           



                                                  days.           

 

     apixaban 5      2022- Yes       [id number] 5mg       Take 1           Univ

ers



     mg tablet      0-19 11-19                          tablet by           ity 

of



               00:00: 05:59                          mouth in           [place]



               00   :00                           the            Medical



                                                  morning           [facility]



                                                  and 1           



                                                  tablet in           



                                                  the            



                                                  evening.           



                                                  Do all           



                                                  this for           



                                                  30 days.           



                                                  Indication           



                                                  s: atrial           



                                                  fibrillati           



                                                  on             

 

     metoprolol      2022- Yes       [id number] 12.5mg      Take 0.5      

     [facility]



     tartrate 25      0-19 11-19                          tablets by           i

ty of



     mg tablet      00:00: 05:59                          mouth in           [facility]

as



               00   :00                           the            Medical



                                                  morning           [facility]



                                                  and 0.5           



                                                  tablets in           



                                                  the            



                                                  evening.           



                                                  Do all           



                                                  this for           



                                                  30 days.           

 

     QUEtiapine      2022- Yes       [id number] 25mg      Take 1          

 [facility]



     25 mg      0-19 11-19                          tablet by           ity of



     tablet      00:00: 05:59                          mouth at           [place]



               00   :00                           bedtime           Medical



                                                  for 30           [facility]



                                                  days.           

 

     cyclobenzap      2022- Yes       [id number] 5mg       Take 1         

  [facility]



     rine 5 mg      0-19 10-30                          tablet by           ity 

of



     tablet      00:00: 04:59                          mouth 3           [place]



               00   :00                           (three)           Medical



                                                  times           [place]



                                                  daily as           



                                                  needed for           



                                                  Muscle           



                                                  Spasms for           



                                                  up to 10           



                                                  days.           

 

     cyclobenzap      2022- Yes       [id number] 5mg       Take 1         

  [facility]



     rine 5 mg      0-19 10-30                          tablet by           ity 

of



     tablet      00:00: 04:59                          mouth 3           [place]



               00   :00                           (three)           Medical



                                                  times           [place]



                                                  daily as           



                                                  needed for           



                                                  Muscle           



                                                  Spasms for           



                                                  up to 10           



                                                  days.           

 

     HYDROmorpho      2022- No             1mg       1 mg, Slow          

 [facility]



     ne        0-18 10-18                          IV Push,           ity of



     (DILAUDID)      22:30: 21:43                          ONCE, 1           [facility]

as



     injection 1      00   :00                           dose, On           Medi

rj



     mg                                           e            [facility]



                                                  10/18/22           



                                                  at 1730,           



                                                  Routine<br           



                                                  >Use           



                                                  approved           



                                                  by             



                                                  (Faculty):           



                                                  [facility]            



                                                  PROVIDER           

 

     apixaban      2022      Yes            5mg       5 mg,           [facility]



     (ELIQUIS)      0-18                               Oral, BID,           ity 

of



     tablet 5 mg      01:00:                               First dose           

[place]



               00                                 on [facility]



                                                  10/17/22           [facility]



                                                  at 2000,           



                                                  Until           



                                                  Discontinu           



                                                  ed,            



                                                  Routine<br           



                                                  >Indicatio           



                                                  ns:            



                                                  Non-Valvul           



                                                  ar Atrial           



                                                  Fibrillati           



                                                  on             

 

     enoxaparin      2022- No             1mg/kg      70 mg           Uni

vers



     (LOVENOX)      0-17 10-17                          (rounded           ity o

f



     injection      01:00: 18:47                          from 69 mg           T

[facility]as



     70 mg      00   :26                           = 1 mg/kg           Medical



                                                  ?69 kg),           [facility]



                                                  [facility],           



                                                  First dose           



                                                  (after           



                                                  last           



                                                  modificati           



                                                  on) on Sun           



                                                  10/16/22           



                                                  at 2000,           



                                                  Until           



                                                  Discontinu           



                                                  ed,            



                                                  Routine           

 

     metoprolol      2022      Yes            12.5mg      12.5 mg,           U

nivers



     tartrate      0-16                               Oral, BID,           ity o

f



     (LOPRESSOR)      17:00:                               First dose           

[place]



     tablet 12.5      00                                 on Sun           Med[facility]

l



     mg                                           10/16/22           [facility]



                                                  at 1200,           



                                                  Until           



                                                  Discontinu           



                                                  ed,            



                                                  Routine           

 

     lactulose      2022      Yes            30mL      30 mL,           Univer

s



     (CEPHULAC)      0-16                               Oral,           ity of



     solution 30      13:57:                               BIDPRN,           [facility]

as



     mL        16                                 Starting           Medical



                                                  on [facility]



                                                  10/16/22           



                                                  at 0857,           



                                                  Until           



                                                  Discontinu           



                                                  ed,            



                                                  Routine,           



                                                  Constipati           



                                                  on             

 

     diltiazem      2022- No             30mg      30 mg,           Unive

rs



     (CARDIZEM)      0-16 10-16                          Oral, BID,           it

y of



     tablet 30      13:00: 15:02                          First dose           T

exas



     mg        00   :38                           on [facility]



                                                  10/16/22           Branch



                                                  at 0800,           



                                                  Until           



                                                  Discontinu           



                                                  ed,            



                                                  Routine           

 

     digoxin      2022 No             500ug      500 mcg,           Univ

ers



     (LANOXIN)      0-16 10-16                          Intravenou           ity

 of



     injection      09:30: 08:43                          s, ONCE, 1           T

exas



     500 mcg      00   :00                           dose, On           [facility]



                                                  10/16/22           



                                                  at 0430,           



                                                  Routine           

 

     diltiazem      2022- No             10mg      10 mg,           Unive

rs



     (CARDIZEM      0-16 10-16                          Slow IV           ity of



     IV)       09:30: 08:41                          Push,           [place]



     injection      00   :00                           ONCE, 1           Medical



     10 mg                                         dose, On           [facility]



                                                  Sun            



                                                  10/16/22           



                                                  at 0430,           



                                                  Routine<br           



                                                  >Faculty           



                                                  member           



                                                  approving           



                                                  Restricted           



                                                  medication           



                                                  :              



                                                  [place]           

 

     enoxaparin      2022- No             1mg/kg      70 mg           Uni

vers



     (LOVENOX)      0-16 10-16                          (rounded           ity o

f



     injection      09:02: 14:20                          from 69 mg           T

exas



     70 mg      22   :11                           = 1 mg/kg           Medical



                                                  ?69 kg),           [facility]



                                                  [facility], Q24H,           



                                                  First dose           



                                                  (after           



                                                  last           



                                                  modificati           



                                                  on) on Sun           



                                                  10/16/22           



                                                  at 0415,           



                                                  Until           



                                                  Discontinu           



                                                  ed,            



                                                  Routine           

 

     furosemide      2022      Yes            20mg      20 mg,           Unive

rs



     (LASIX)      0-15                               Oral,           ity of



     tablet 20      14:00:                               DAILY,           [place]



     mg        00                                 First dose           Medical



                                                  (after           [facility]



                                                  last           



                                                  modificati           



                                                  on) on Sat           



                                                  10/15/22           



                                                  at 0900,           



                                                  Until           



                                                  Discontinu           



                                                  ed,            



                                                  Routine           

 

     predniSONE      2022- No             40mg      40 mg,           Univ

ers



     (DELTASONE)      0-15 10-17                          Oral,           ity of



     tablet 40      14:00: 13:05                          DAILY, 3           [facility]

as



     mg        00   :00                           doses,           Medical



                                                  First dose           [facility]



                                                  (after           



                                                  last           



                                                  modificati           



                                                  on) on Sat           



                                                  10/15/22           



                                                  at 0900,           



                                                  Last dose           



                                                  on Mon           



                                                  10/17/22           



                                                  at 0900,           



                                                  Routine           

 

     QUEtiapine      2022      Yes            25mg      25 mg,           Unive

rs



     (SEROQUEL)      0-15                               Oral, QHS,           ity

 of



     tablet 25      02:00:                               First dose           [facility]

[place]



     mg        00                                 on Fri           Medical



                                                  10/14/22           [facility]



                                                  at 2100,           



                                                  Until           



                                                  Discontinu           



                                                  ed,            



                                                  Routine           

 

     spironolact      2022      Yes            25mg      25 mg,           Univ

ers



     one       0-14                               Oral,           ity of



     (ALDACTONE)      14:00:                               DAILY,           [place]

[place]



     tablet 25      00                                 First dose           Medi

rj



     mg                                           on Fri           [facility]



                                                  10/14/22           



                                                  at 0900,           



                                                  Until           



                                                  Discontinu           



                                                  ed,            



                                                  Routine           

 

     sennosides-      2022      Yes            1{tbl}      1 tablet,          

 [facility]



     docusate      0-14                               Oral, BID,           ity o

f



     sodium      13:00:                               First dose           [place]



     (SENOKOT-S)      00                                 on Fri           [facility]

l



     8.6-50 mg                                         10/14/22           [facility]



     per tablet                                         at 0800,           



     1 tablet                                         Until           



                                                  Discontinu           



                                                  ed,            



                                                  Routine           

 

     traZODone      2022      Yes            50mg      50 mg,           Un[facility]

s



     (DESYREL)      0-14                               Oral,           [facility] of



     tablet 50      11:05:                               TIDPRN,           [place]



     mg        32                                 Starting           Medical



                                                  on Fri           [facility]



                                                  10/14/22           



                                                  at 0605,           



                                                  Until           



                                                  Discontinu           



                                                  ed,            



                                                  Routine,           



                                                  Insomnia,           



                                                  agitation           

 

     QUEtiapine      2022 No             12.5mg      12.5 mg,           

[facility]



     (SEROQUEL)      0-14 10-14                          Oral, ONCE           it

y of



     tablet 12.5      06:00: 05:09                          NOW, 1           [facility]

as



     mg        00   :00                           dose, On           Medical



                                                  Fri            [facility]



                                                  10/14/22           



                                                  at 0100,           



                                                  Routine           

 

     amLODIPine      2022 No             5mg       5 mg,           Unive

rs



     (NORVASC)      0-13 10-16                          Oral,           ity of



     tablet 5 mg      20:15: 09:28                          DAILY,           [facility]

as



               00   :45                           First dose           Medical



                                                  on Thu           [facility]



                                                  10/13/22           



                                                  at 1515,           



                                                  Until           



                                                  Discontinu           



                                                  ed,            



                                                  Routine           

 

     acetaZOLAMI      2022 No             250mg      250 mg,           U

nivers



     DE (DIAMOX)      0-13 10-                          Oral,           ity of



     tablet 250      17:00: 16:46                          ONCE, 1           [facility]

as



     mg        00   :00                           dose, On           Medical



                                                  Thu            [facility]



                                                  10/13/22           



                                                  at 1200,           



                                                  Routine           

 

     iron      2022 No             200mg      200 mg, IV           Unive

rs



     sucrose      0-13 10-17                          Infusion,           ity of



     (VENOFER)      15:00: 18:37                          DAILY,           [place]



     200 mg in      00   :00                           Administer           Medi

rj



     NaCl 0.9%                                         over 2.5           [facility]



     (NS) 100 mL                                         Hours,           



     infusion                                         First dose           



                                                  on Thu           



                                                  10/13/22           



                                                  at 1000,           



                                                  For 5           



                                                  doses           

 

     cyclobenzap      2022      Yes            5mg       5 mg,           Unive

rs



     rine      0-13                               Oral, TID,           ity of



     (FLEXERIL)      14:15:                               First dose           [facility]

[facility]



     tablet 5 mg      00                                 (after           [facility]

l



                                                  last           [facility]



                                                  modificati           



                                                  on) on Thu           



                                                  10/13/22           



                                                  at 0915,           



                                                  Until           



                                                  Discontinu           



                                                  ed,            



                                                  Routine           

 

     predniSONE      2022 No             40mg      40 mg,           Univ

ers



     (DELTASONE)      0-13 10-14                          Oral,           ity of



     tablet 40      14:00: 16:57                          DAILY,           [place]



     mg        00   :40                           First dose           Medical



                                                  (after           [facility]



                                                  last           



                                                  modificati           



                                                  on) on Thu           



                                                  10/13/22           



                                                  at 0900,           



                                                  Until           



                                                  Discontinu           



                                                  ed,            



                                                  Routine           

 

     furosemide      2022 No             20mg      20 mg,           Univ

ers



     (LASIX)      0-13 10-14                          Oral,           ity of



     tablet 20      14:00: 17:06                          QAM+PM,           [place]

[facility]



     mg        00   :57                           First dose           Medical



                                                  on Thu           [facility]



                                                  10/13/22           



                                                  at 0900,           



                                                  Until           



                                                  Discontinu           



                                                  ed,            



                                                  Routine           

 

     cyclobenzap      2022 No             2.5mg      2.5 mg,           U

nivers



     rine      0-12 10-13                          Oral, TID,           ity of



     (FLEXERIL)      22:00: 14:02                          First dose           

[place]



     tablet 2.5      00   :07                           on Wed           Medical



     mg                                           10/12/22           [facility]



                                                  at 1700,           



                                                  Until           



                                                  Discontinu           



                                                  ed,            



                                                  Routine           

 

     polyethylen      2022      Yes            17g       17 g,           Unive

rs



     e glycol      0-12                               Oral, BID,           ity o

f



     3350 powder      21:15:                               First dose           

[place]



     17 g      00                                 on Wed           Medical



                                                  10/12/22           [facility]



                                                  at 1615,           



                                                  Until           



                                                  Discontinu           



                                                  ed,            



                                                  Routine           

 

     HYDROmorpho      2022      Yes            1mg       1 mg,           Unive

rs



     ne        0-12                               Oral, Q8H,           ity of



     (DILAUDID)      21:15:                               First dose           [facility]

[facility]



     tablet 1 mg      00                                 on Wed           [facility]

l



                                                  10/12/22           [facility]



                                                  at 1615,           



                                                  Until           



                                                  Discontinu           



                                                  ed,            



                                                  Routine           

 

     traMADoL      2022      Yes            100mg      100 mg,           Unive

rs



     (ULTRAM)      0-12                               Oral, Q8H,           ity o

f



     tablet 100      21:15:                               First dose           T

[facility]



     mg        00                                 (after           Medical



                                                  last           [facility]



                                                  modificati           



                                                  on) on Wed           



                                                  10/12/22           



                                                  at 1615,           



                                                  Until           



                                                  Discontinu           



                                                  ed,            



                                                  Routine           

 

     acetaminoph      2022      Yes            500mg      500 mg,           Un

tanvi



     en        0-12                               Oral, Q6H,           ity of



     (TYLENOL)      21:15:                               First dose           [facility]

[place]



     tablet 500      00                                 on Wed           [facility]



     mg                                           10/12/22           [facility]



                                                  at 1615,           



                                                  Until           



                                                  Discontinu           



                                                  ed,            



                                                  Routine           

 

     sennosides      2022 No             8.6mg      8.6 mg,           Un

tanvi



     (SENOKOT)      0-12 10-14                          Oral, BID,           ity

 of



     tablet 8.6      21:15: 11:13                          First dose           

[place]



     mg        00   :14                           on Wed           Medical



                                                  10/12/22           [facility]



                                                  at 1615,           



                                                  Until           



                                                  Discontinu           



                                                  ed,            



                                                  Routine           

 

     HYDROmorpho      2022      Yes            .5mg      0.5 mg,           Uni

vers



     ne        0-12                               Slow IV           ity of



     (DILAUDID)      20:57:                               Push,           [place]



     injection      15                                 Q3HPRN,           Medical



     0.5 mg                                         Starting           [facility]



                                                  on Wed           



                                                  10/12/22           



                                                  at 1557,           



                                                  Until           



                                                  Discontinu           



                                                  ed,            



                                                  Routine,           



                                                  Pain           



                                                  (scale           



                                                  7-10)<br>U           



                                                  se             



                                                  approved           



                                                  by             



                                                  (Faculty):           



                                                  ADC            



                                                  PROVIDER           

 

     sodium      2022      Yes            4mL       4 mL,           [facility]



     chloride 7%      0-12                               Inhalation           it

y of



     (HYPER-SAL)      14:45:                               , BID,           [place]

[facility]



     nebulizer      00                                 First dose           Medi

rj



     solution 4                                         on Wed           [facility]



     mL                                           10/12/22           



                                                  at 0945,           



                                                  Until           



                                                  Discontinu           



                                                  ed,            



                                                  Routine           

 

     KCL 20      2022 No             20meq      20 mEq,           [facility]

s



     mEq/15 mL      0-12 10-13                          Oral,           ity of



     solution 20      14:00: 14:49                          DAILY,           [facility]

as



     mEq       00   :54                           First dose           Medical



                                                  (after           [facility]



                                                  last           



                                                  reorder)           



                                                  on Wed           



                                                  10/12/22           



                                                  at 0900,           



                                                  Until           



                                                  Discontinu           



                                                  ed,            



                                                  Routine           

 

     predniSONE      2022 No             50mg      50 mg,           Univ

ers



     (DELTASONE)      0-12 10-12                          Oral,           ity of



     tablet 50      14:00: 21:43                          DAILY,           [place]



     mg        00   :02                           First dose           Medical



                                                  on Wed           [facility]



                                                  10/12/22           



                                                  at 0900,           



                                                  Until           



                                                  Discontinu           



                                                  ed,            



                                                  Routine           

 

     furosemide      2022- No             40mg      40 mg,           Univ

ers



     (LASIX)      0-12 10-12                          Slow IV           ity of



     injection      01:00: 21:37                          Push,           [place]



     40 mg      00   :39                           Q12H,           Medical



                                                  First dose           [facility]



                                                  (after           



                                                  last           



                                                  modificati           



                                                  on) on Tue           



                                                  10/11/22           



                                                  at 2000,           



                                                  Until           



                                                  Discontinu           



                                                  ed,            



                                                  Routine           

 

     cefTRIAXone      2022 No             1000mg      1,000 mg,         

  [facility]



     (ROCEPHIN)      0-12 10-17                          IV             ity of



     1,000 mg in      00:15: 14:17                          Piggyback,          

 [place]



     NaCl 0.9%      00   :00                           DAILY, 7           Medica

l



     (NS) 50 mL                                         doses,           [facility]



     MINI-BAG                                         First dose           



                                                  on Tue           



                                                  10/11/22           



                                                  at 1915,           



                                                  Last dose           



                                                  on Mon           



                                                  10/17/22           



                                                  at 0900,           



                                                  Administer           



                                                  over 30           



                                                  Minutes,           



                                                  50             



                                                  mL<br>Reas           



                                                  on for           



                                                  Anti-Infec           



                                                  tive:           



                                                  Empir[facility]           



                                                  Therapy           



                                                  for            



                                                  Suspected           



                                                  Infection<           



                                                  br>[facility]           



                                                  Therapy           



                                                  Site:           



                                                  Respirator           



                                                  [email or web address]           



                                                  ion of           



                                                  therapy:           



                                                  72 hours           

 

     KCL 20      2022- No             20meq      20 mEq,           Univer

s



     mEq/15 mL      0-11 10-11                          Oral,           ity of



     solution 20      18:30: 17:59                          ONCE, 1           [facility]

[facility]



     mEq       00   :00                           dose, On           Medical



                                                  [facility]



                                                  10/11/22           



                                                  at 1330,           



                                                  Routine           

 

     lidocaine      2022 No             1{patch      1 Patch,           

[facility]



     (LIDODERM)      0-11 10-11                }         Topical,           ity 

of



     5 % (700      08:15: 19:28                          Administer           Te

[facility]



     mg/patch)      00   :00                           over 12           Medical



     patch 1                                         Hours,           [facility]



     Patch                                         ONCE, 1           



                                                  dose, On           



                                                  Tue            



                                                  10/11/22           



                                                  at 0315,           



                                                  Routine           

 

     haloperidol      2022 No             5mg       5 mg, Slow          

 [facility]



     lactate      0-11 10-11                          IV Push,           ity of



     (HALDOL)      05:30: 04:52                          ONCE, 1           [place]



     injection 5      00   :00                           dose, On           Medi

rj



     mg                                           [facility]



                                                  10/11/22           



                                                  at 0030,           



                                                  Routine           

 

     cyclobenzap      2022- No             5mg       5 mg,           Univ

ers



     rine      0-11 10-12                          Oral,           ity of



     (FLEXERIL)      04:34: 21:05                          TIDPRN,           [facility]

as



     tablet 5 mg      44   :10                           Starting           Medi

rj



                                                  on Mon           [place]



                                                  10/10/22           



                                                  at 2334,           



                                                  Until Wed           



                                                  10/12/22           



                                                  at 1605,           



                                                  Routine,           



                                                  Muscle           



                                                  Spasms           

 

     traMADoL      2022- No             50mg      50 mg,           Univer

s



     (ULTRAM)      0-11 10-12                          Oral,           ity of



     tablet 50      04:34: 21:04                          Q8HPRN,           [place]

[place]



     mg        30   :33                           Starting           Medical



                                                  on Mon           [place]



                                                  10/10/22           



                                                  at 2334,           



                                                  Until Wed           



                                                  10/12/22           



                                                  at 1604,           



                                                  Routine,           



                                                  Pain           



                                                  (scale           



                                                  4-6)           

 

     SERTraline      2022      Yes            25mg      25 mg,           Unive

rs



     (ZOLOFT)      0-10                               Oral,           ity of



     tablet 25      14:00:                               DAILY,           [place]



     mg        00                                 First dose           Medical



                                                  on [facility]



                                                  10/10/22           



                                                  at 0900,           



                                                  Until           



                                                  Discontinu           



                                                  ed,            



                                                  Routine           

 

     clopidogreL      2022      Yes            75mg      75 mg,           Univ

ers



     (PLAVIX) 75      0-10                               Oral,           ity of



     mg tablet      14:00:                               DAILY,           [place]



     75 mg      00                                 First dose           Medical



                                                  on [facility]



                                                  10/10/22           



                                                  at 0900,           



                                                  Until           



                                                  Discontinu           



                                                  ed,            



                                                  Routine           

 

     aspirin      2022 No             81mg      81 mg,           [facility]



     chewable      0-10 10-16                          Oral,           ity of



     tablet 81      14:00: 09:00                          DAILY,           [place]



     mg        00   :14                           First dose           Medical



                                                  on [facility]



                                                  10/10/22           



                                                  at 0900,           



                                                  Until           



                                                  Discontinu           



                                                  ed,            



                                                  Routine           

 

     methylPREDN      2022 No             40mg      40 mg,           Uni

vers



     ISolone sod      0-10 10-12                          Intravenou           i

ty of



     succ      13:00: 00:05                          s, Q12H,           [place]



     (SOLU-MEDRO      00   :31                           First dose           Me

dical



     L (PF))                                         on [facility]



     injection                                         10/10/22           



     40 mg                                         at 0800,           



                                                  Until           



                                                  Discontinu           



                                                  ed, 1 mL           

 

     LORazepam      2022 No             .5mg      0.5 mg,           Univ

ers



     (ATIVAN)      0-10 10-10                          Slow IV           ity of



     injection      08:30: 09:46                          Push,           [place]



     0.5 mg      00   :00                           ONCE, 1           Medical



                                                  dose, On           [facility]



                                                  Mon            



                                                  10/10/22           



                                                  at 0330,           



                                                  Routine           

 

     morpHINE (2      2022 No             2mg       2 mg, Slow          

 [facility]



     mg/mL)      0-10 10-12                          IV Push,           ity of



     injection 2      07:32: 19:00                          Q4HPRN,           [place]

[place]



     mg        07   :39                           Starting           Medical



                                                  on [facility]



                                                  10/10/22           



                                                  at 0232,           



                                                  Until Wed           



                                                  10/12/22           



                                                  at 1400,           



                                                  Routine,           



                                                  Pain           



                                                  (scale           



                                                  7-10)           

 

     azithromyci      2022 No             500mg      500 mg, IV         

  [facility]



     n         0-10 10-12                          Piggyback,           ity of



     (ZITHROMAX)      06:15: 15:15                          Q24H ABX,           

[place]



     500 mg in      00   :33                           6 doses,           Medica

l



     NaCl 0.9%                                         First dose           [facility]

ch



     (NS) 250 mL                                         on Mon           



     VIAL-MATE                                         10/10/22           



     IV                                           at 0115,           



     piggyback                                         Last dose           



                                                  on Sat           



                                                  10/15/22           



                                                  at 0115,           



                                                  Administer           



                                                  over 60           



                                                  Minutes,           



                                                  250            



                                                  mL<br>Reas           



                                                  on for           



                                                  Anti-Infec           



                                                  tive:           



                                                  Empiric           



                                                  Therapy           



                                                  for            



                                                  Suspected           



                                                  Infection<           



                                                  br>Empiric           



                                                  Therapy           



                                                  Site:           



                                                  Respirator           



                                                  y<br>Durat           



                                                  ion of           



                                                  therapy: 5           



                                                  days           

 

     haloperidol      2022 No             5mg       5 mg, Slow          

 [facility]



     lactate      0-10 10-10                          IV Push,           ity of



     (HALDOL)      06:00: 05:49                          ONCE, 1           [place]



     injection 5      00   :00                           dose, On           Medi

rj



     mg                                           [facility]



                                                  10/10/22           



                                                  at 0100,           



                                                  Routine           

 

     methylpredn      2022 No             125mg      125 mg,           U

nivers



     isolone sod      0-10 10-10                          Intravenou           i

ty of



     succ      05:45: 05:49                          s, ONCE, 1           [place]



     (SOLU-MEDRO      00   :00                           dose, On           Medi

rj



     L)                                           [facility]



     injection                                         10/10/22           



     125 mg                                         at 0045, 2           



                                                  mL             

 

     budesonide      2022      Yes            .5mg      0.5 mg,           Univ

ers



     (PULMICORT      0-10                               Inhalation           ity

 of



     RESPULE)      05:00:                               , BID,           [place]



     nebulizer      00                                 First dose           Medi

rj



     solution                                         on [facility]



     0.5 mg                                         10/10/22           



                                                  at 0000,           



                                                  Until           



                                                  Discontinu           



                                                  ed,            



                                                  Routine           

 

     ipratropium      2022      Yes            3mL       3 mL,           Unive

rs



     -albuteroL      0-10                               Inhalation           ity

 of



     (DUONEB)      05:00:                               , Q4H,           [place]



     0.5 mg-3      00                                 First dose           Medic

al



     mg(2.5 mg                                         on [facility]



     base)/3 mL                                         10/10/22           



     nebulizer                                         at 0000,           



     solution 3                                         Until           



     mL                                           Discontinu           



                                                  ed,            



                                                  Routine           

 

     atorvastati      2022      Yes            20mg      20 mg,           Univ

ers



     n (LIPITOR)      0-10                               Oral, QHS,           it

y of



     tablet 20      02:00:                               First dose           [place]

[place]



     mg        00                                 on [facility]



                                                  10/9/22 at           [facility]



                                                  2100,           



                                                  Until           



                                                  Discontinu           



                                                  ed,            



                                                  Routine           

 

     Fluticasone      2022- No             1{puff}      1 Puff,          

 [facility]



     -Salmeterol      0-10 10-10                          Inhalation           i

ty of



     (ADVAIR)      01:00: 04:48                          , Q12H,           [place]



     100-50      00   :02                           First dose           Medical



     mcg/dose                                         on Sun           [facility]



     inhalation                                         10/9/22 at           



     disk 1 Puff                                         ,           



                                                  Until           



                                                  Discontinu           



                                                  ed             

 

     nicotine      2022      Yes            1{patch      1 Patch,           Un

tanvi



     (NICODERM)      0-10                     }         Topical,           ity o

f



     21 mg/24 hr      00:00:                               Administer           

[place]



     patch 1      00                                 over 24           Medical



     Patch                                         Hours,           [facility]



                                                  Q24H,           



                                                  First dose           



                                                  on Sun           



                                                  10/9/22 at           



                                                  1900,           



                                                  Until           



                                                  Discontinu           



                                                  ed,            



                                                  Routine           

 

     hydralAZINE      2022      Yes            10mg      10 mg,           Univ

ers



     (APRESOLINE      0-09                               Slow IV           ity o

f



     ) injection      22:46:                               Push,           [place]



     10 mg      17                                 Q4HPRN,           Medical



                                                  Starting           [facility]



                                                  on Sun           



                                                  10/9/22 at           



                                                  1746,           



                                                  Until           



                                                  Discontinu           



                                                  ed,            



                                                  Routine,           



                                                  DBP=&gt;10           



                                                  0;             



                                                  SBP=>180           

 

     enoxaparin      2022- No             30mg      30 mg,           Univ

ers



     (LOVENOX)      0-09 10-16                          Subcutaneo           ity

 of



     injection      22:00: 08:57                          us, DAILY,           [place]

[place]



     30 mg      00   :38                           First dose           Medical



                                                  on Sun           [facility]



                                                  10/9/22 at           



                                                  1700,           



                                                  Until           



                                                  Discontinu           



                                                  ed,            



                                                  Routine           

 

     furosemide      2022- No             40mg      40 mg,           Univ

ers



     (LASIX)      0-09 10-11                          Slow IV           ity of



     injection      20:45: 14:49                          Push, Q8H,           [facility]

[place]



     40 mg      00   :09                           First dose           Medical



                                                  on Sun           [facility]



                                                  10/9/22 at           



                                                  1545,           



                                                  Until           



                                                  Discontinu           



                                                  ed,            



                                                  Routine           

 

     aspirin      2022 No             324mg      324 mg,           Unive

rs



     chewable      0-09 10-09                          Oral,           ity of



     tablet 324      17:30: 16:59                          ONCE, 1           [place]

[facility]



     mg        00   :00                           dose, On           Medical



                                                  Sun            [facility]



                                                  10/9/22 at           



                                                  1230,           



                                                  Routine           

 

     morpHINE (4      2022- No             4mg       4 mg, Slow          

 [facility]



     mg/mL)      0-09 10-09                          IV Push,           ity of



     injection 4      16:15: 16:19                          ONCE, 1           Te

[place]



     mg        00   :00                           dose, On           Medical



                                                  Sun            [facility]



                                                  10/9/22 at           



                                                  1115, STAT           

 

     furosemide      2022 No             40mg      40 mg, IV           U

nivers



     (LASIX)      0-09 10-                          Push,           ity of



     injection      15:45: 14:55                          ONCE, 1           [place]

[place]



     40 mg      00   :00                           dose, On           Medical



                                                  Sun            [facility]



                                                  10/9/22 at           



                                                  1045, ASAP           

 

     HYDROcodone      2021- No        [id number] 1{tbl} Q4H  Take 1           

Methodi



     -acetaminop      1-23 12-04                          tablet by           st



     hen (NORCO)      00:00: 05:59                          mouth           Hosp

[facility]



     5-325 mg      00   :00                           every 4           l



     per tablet                                         (four)           



                                                  hours as           



                                                  needed for           



                                                  severe           



                                                  pain for           



                                                  up to 10           



                                                  days           



                                                  .acute           



                                                  pain. Max           



                                                  Daily           



                                                  Amount: 6           



                                                  tablets           

 

     acetaminoph      2021      Yes                 Q8H  Take by           Met

hodi



     en-codeine      1-20                               mouth           st



     (TYLENOL      00:00:                               every 8           Hospit

a



     WITH      00                                 (eight)           l



     CODEINE #3)                                         hours as           



     300-30 mg                                         needed.           



     per tablet                                                        

 

     lisinopriL      2021      Yes                                     Methodi



     (PRINIVIL)      0-20                                              st



     5 mg tablet      00:00:                                              Hospit

a



               00                                                l

 

     fluticasone            Yes       [id number] 2{puff}      Inhale 2     

      [facility]



     -salmeterol      1-30                               Puffs 2           ity o

f



     115-21      00:00:                               (two)           [place]



     mcg/actuati      00                                 times           Medical



     on inhaler                                         daily.           [place]

 

     lisinopril            Yes       [id number] 10mg      Take 2           

[facility]



     5 mg tablet      1-30                               tablets by           it

y of



               00:00:                               mouth 2           [place]



               00                                 (two)           Medical



                                                  times           [facility]



                                                  daily.           

 

     fluticasone            Yes       [id number] 1{spray      Use 1        

   [facility]



     50        1-30                     }         Spray in           ity of



     mcg/actuati      00:00:                               each           [place]



     on nasal      00                                 nostril           Medical



     spray                                         daily.           [facility]

 

     fluticasone            Yes       [id number] 2{puff}      Inhale 2     

      [facility]



     -salmeterol      1-30                               Puffs 2           ity o

f



     115-21      00:00:                               (two)           [place]



     mcg/actuati      00                                 times           Medical



     on inhaler                                         daily.           [facility]

 

     fluticasone            Yes       [id number] 2{puff}      Inhale 2     

      [facility]



     -salmeterol      1-30                               Puffs 2           ity o

f



     115-21      00:00:                               (two)           [place]



     mcg/actuati      00                                 times           Medical



     on inhaler                                         daily.           [facility]

 

     lisinopril            Yes       [id number] 10mg      Take 2           

[facility]



     5 mg tablet      1-30                               tablets by           it

y of



               00:00:                               mouth 2           [place]



               00                                 (two)           Medical



                                                  times           [facility]



                                                  daily.           

 

     fluticasone            Yes       [id number] 1{spray      Use 1        

   [facility]



     50        1-30                     }         Spray in           ity of



     mcg/actuati      00:00:                               each           [place]



     on nasal      00                                 nostril           Medical



     spray                                         daily.           [facility]

 

     fluticasone            Yes       [id number] 2{puff}      Inhale 2     

      [facility]



     -salmeterol      1-30                               Puffs 2           ity o

f



     115-21      00:00:                               (two)           [place]



     mcg/actuati      00                                 times           Medical



     on inhaler                                         daily.           [facility]

 

     lisinopril            Yes       [id number] 10mg      Take 2           

[facility]



     5 mg tablet      1-30                               tablets by           it

y of



               00:00:                               mouth 2           [place]



                                                (two)           [facility]



                                                  times           [facility]



                                                  daily.           

 

     fluticasone            Yes       [id number] 1{spray      Use 1        

   [facility]



     50        1-30                     }         Spray in           [facility] of



     mcg/actuati      00:00:                               each           [place]



     on nasal      00                                 nostril           Medical



     spray                                         daily.           [facility]

 

     fluticasone            Yes       [id number] 2{puff}      Inhale 2     

      [facility]



     -salmeterol      1-30                               Puffs 2           ity o

f



     115-21      00:00:                               (two)           [place]



     mcg/actuati      00                                 times           Medical



     on inhaler                                         daily.           [facility]

 

     lisinopril            Yes       [id number] 10mg      Take 2           

[facility]



     5 mg tablet      1-30                               tablets by           it

y of



               00:00:                               mouth 2           [place]



               00                                 (two)           Medical



                                                  times           [facility]



                                                  daily.           

 

     fluticasone            Yes       [id number] 1{spray      Use 1        

   [facility]



     50        1-30                     }         Spray in           [facility] of



     mcg/actuati      00:00:                               each           [place]



     on nasal      00                                 nostril           Medical



     spray                                         daily.           [facility]

 

     fluticasone            Yes       [id number] 2{puff}      Inhale 2     

      [facility]



     -salmeterol      1-30                               Puffs 2           ity o

f



     115-21      00:00:                               (two)           [place]



     mcg/actuati      00                                 times           Medical



     on inhaler                                         daily.           [facility]

 

     lisinopril      2022- No        [id number] 10mg      Take 2          

 [facility]



     5 mg tablet      1-30 10-10                          tablets by           i

ty of



               00:00: 00:00                          mouth 2           [place]



               00   :00                           (two)           Medical



                                                  times           [facility]



                                                  daily.           

 

     fluticasone      2022- No        [id number] 1{spray      Use 1       

    [facility]



     50        1-30 10-10                }         Spray in           [facility] of



     mcg/actuati      00:00: 00:00                          each           [place]



     on nasal      00   :00                           nostril           Medical



     spray                                         daily.           [facility]

 

     albuterol            Yes       [id number] 2{puff}      Inhale 2       

    [facility]



     (PROAIR      1-26                               Puffs           [facility] of



     HFA) 90      00:00:                               every 6           [place]



     mcg/actuati      00                                 (six)           Medical



     on inhaler                                         hours as           [facility]

h



                                                  needed for           



                                                  Wheezing           



                                                  or             



                                                  Shortness           



                                                  of Breath.           

 

     albuterol            Yes       [id number] 2{puff}      Inhale 2       

    [facility]



     (PROAIR      1-26                               Puffs           [facility] of



     HFA) 90      00:00:                               every 6           [place]



     mcg/actuati      00                                 (six)           Medical



     on inhaler                                         hours as           [facility]

h



                                                  needed for           



                                                  Wheezing           



                                                  or             



                                                  Shortness           



                                                  of Breath.           

 

     albuterol            Yes       [id number] 2{puff}      Inhale 2       

    [facility]



     (PROAIR      1-26                               Puffs           [facility] of



     HFA) 90      00:00:                               every 6           [place]



     mcg/actuati      00                                 (six)           Medical



     on inhaler                                         hours as           [facility]

h



                                                  needed for           



                                                  Wheezing           



                                                  or             



                                                  Shortness           



                                                  of Breath.           

 

     albuterol            Yes       [id number] 2{puff}      Inhale 2       

    [facility]



     (PROAIR      1-26                               Puffs           [facility] of



     HFA) 90      00:00:                               every 6           [place]



     mcg/actuati      00                                 (six)           Medical



     on inhaler                                         hours as           [facility]

h



                                                  needed for           



                                                  Wheezing           



                                                  or             



                                                  Shortness           



                                                  of Breath.           

 

     albuterol      2022- No        [id number] 2{puff}      Inhale 2      

     [facility]



     (PROAIR      1-26 10-10                          Puffs           [facility] of



     HFA) 90      00:00: 00:00                          every 6           [place]



     mcg/actuati      00   :00                           (six)           Medical



     on inhaler                                         hours as           [facility]

h



                                                  needed for           



                                                  Wheezing           



                                                  or             



                                                  Shortness           



                                                  of Breath.           

 

     traMADOL      2018-0      Yes            50mg      Take 1           [facility]



     (ULTRAM) 50      4-14                               tablet by           [facility]

 of



     mg tablet      00:00:                               mouth           [place]



               00                                 every 6           Medical



                                                  (six)           [facility]



                                                  hours as           



                                                  needed for           



                                                  Pain           



                                                  (scale           



                                                  4-6).           

 

     traMADOL      2018-0      Yes            50mg      Take 1           [facility]



     (ULTRAM) 50      4-14                               tablet by           [facility]

 of



     mg tablet      00:00:                               mouth           [place]



                                                every 6           [facility]



                                                  (six)           [facility]



                                                  hours as           



                                                  needed for           



                                                  Pain           



                                                  (scale           



                                                  4-6).           

 

     traMADOL            Yes            50mg      Take 1           [facility]



     (ULTRAM) 50      4-14                               tablet by           [facility]

 of



     mg tablet      00:00:                               mouth           [place]



               00                                 every 6           [facility]



                                                  (six)           [facility]



                                                  hours as           



                                                  needed for           



                                                  Pain           



                                                  (scale           



                                                  4-6).           

 

     traMADOL            Yes            50mg      Take 1           [facility]



     (ULTRAM) 50      4-14                               tablet by           [facility]

 of



     mg tablet      00:00:                               mouth           [place]



               00                                 every 6           [facility]



                                                  (six)           [facility]



                                                  hours as           



                                                  needed for           



                                                  Pain           



                                                  (scale           



                                                  4-6).           

 

     traMADOL      2022- No             50mg      Take 1           [facility]

s



     (ULTRAM) 50      4-14 10-10                          tablet by           it

y of



     mg tablet      00:00: 00:00                          mouth           [place]



               00   :00                           every 6           Medical



                                                  (six)           [facility]



                                                  hours as           



                                                  needed for           



                                                  Pain           



                                                  (scale           



                                                  4-6).           

 

     aspirin 81      2016      Yes            81mg      Take 1           [facility]

[facility]



     mg chewable      1-28                               tablet by           [facility]

 of



     tablet      00:00:                               mouth           [place]



               00                                 daily.           Medical



                                                                 [facility]

 

     atorvastati      2016      Yes            20mg      Take 1           [facility]

ers



     n (LIPITOR)      1-28                               tablet by           [facility]

 of



     20 mg      00:00:                               mouth at           [place]



     tablet      00                                 bedtime.           Medical



                                                                 [facility]

 

     clopidogrel      2016      Yes            75mg      Take 1           [facility]

ers



     (PLAVIX) 75      1-28                               tablet by           [facility]

 of



     mg tablet      00:00:                               mouth           [place]



               00                                 daily.           Medical



                                                                 [facility]

 

     aspirin 81      2016      Yes            81mg      Take 1           [facility]

[facility]



     mg chewable      1-28                               tablet by           [facility]

 of



     tablet      00:00:                               mouth           [place]



               00                                 daily.           Medical



                                                                 [facility]

 

     acetaminoph      2016      Yes            650mg      Take 2           [facility]

[facility]



     en        1-28                               tablets by           [facility] of



     (TYLENOL)      00:00:                               mouth           [place]



     325 mg      00                                 every 6           Medical



     tablet                                         (six)           [facility]



                                                  hours as           



                                                  needed for           



                                                  Pain           



                                                  (scale           



                                                  1-3).           

 

     atorvastati      2016      Yes            20mg      Take 1           [facility]

ers



     n (LIPITOR)      1-28                               tablet by           [facility]

 of



     20 mg      00:00:                               mouth at           [place]



     tablet      00                                 bedtime.           Medical



                                                                 [facility]

 

     bisacodyl      2016      Yes            10mg      Take 2           [facility]

s



     (DULCOLAX)      1-28                               tablets by           [facility]

 of



     5 mg EC      00:00:                               mouth once           [place]

[facility]



     tablet      00                                 daily as           [facility]



                                                  needed for           [facility]



                                                  Constipati           



                                                  on.            

 

     docusate      2016      Yes            100mg      Take 1           [facility]

s



     (COLACE)      1-28                               capsule by           [facility] o

f



     100 mg      00:00:                               mouth 2           [place]



     capsule      00                                 (two)           [facility]



                                                  times           [facility]



                                                  daily.           

 

     clopidogrel      2016      Yes            75mg      Take 1           [facility]

ers



     (PLAVIX) 75      1-28                               tablet by           [facility]

 of



     mg tablet      00:00:                               mouth           [place]



               00                                 daily.           Medical



                                                                 [facility]

 

     SERTraline      2016      Yes            25mg      Take 1           [facility]

rs



     (ZOLOFT) 25      1-28                               tablet by           [facility]

 of



     mg tablet      00:00:                               mouth           [place]



               00                                 daily.           Medical



                                                                 [facility]

 

     aspirin 81      2016      Yes            81mg      Take 1           [facility]

rs



     mg chewable      1-28                               tablet by           [facility]

 of



     tablet      00:00:                               mouth           [place]



               00                                 daily.           Medical



                                                                 [facility]

 

     acetaminoph      2016      Yes            650mg      Take 2           [facility]

[facility]



     en        1-28                               tablets by           [facility] of



     (TYLENOL)      00:00:                               mouth           [place]



     325 mg      00                                 every 6           [facility]



     tablet                                         (six)           [facility]



                                                  hours as           



                                                  needed for           



                                                  Pain           



                                                  (scale           



                                                  1-3).           

 

     atorvastati      2016      Yes            20mg      Take 1           [facility]

ers



     n (LIPITOR)      1-28                               tablet by           [facility]

 of



     20 mg      00:00:                               mouth at           [place]



     tablet      00                                 bedtime.           Medical



                                                                 [facility]

 

     bisacodyl      2016      Yes            10mg      Take 2           [facility]

s



     (DULCOLAX)      1-28                               tablets by           [facility]

 of



     5 mg EC      00:00:                               mouth once           [place]

[facility]



     tablet      00                                 daily as           [facility]



                                                  needed for           [facility]



                                                  Constipati           



                                                  on.            

 

     docusate      2016      Yes            100mg      Take 1           [facility]

s



     (COLACE)      1-28                               capsule by           [facility] o

f



     100 mg      00:00:                               mouth 2           [place]



     capsule      00                                 (two)           Medical



                                                  times           [facility]



                                                  daily.           

 

     aspirin 81      2016      Yes            81mg      Take 1           [facility]

rs



     mg chewable      1-28                               tablet by           [facility]

 of



     tablet      00:00:                               mouth           [place]



               00                                 daily.           Medical



                                                                 [facility]

 

     atorvastati      2016      Yes            20mg      Take 1           [facility]

ers



     n (LIPITOR)      1-28                               tablet by           [facility]

 of



     20 mg      00:00:                               mouth at           [place]



     tablet      00                                 bedtime.           Medical



                                                                 [facility]

 

     clopidogrel      2016      Yes            75mg      Take 1           [facility]

ers



     (PLAVIX) 75      1-28                               tablet by           [facility]

 of



     mg tablet      00:00:                               mouth           [place]



               00                                 daily.           Medical



                                                                 [facility]

 

     clopidogrel      2016      Yes            75mg      Take 1           [facility]

ers



     (PLAVIX) 75      1-28                               tablet by           [facility]

 of



     mg tablet      00:00:                               mouth           [place]



               00                                 daily.           Medical



                                                                 [facility]

 

     SERTraline      2016      Yes            25mg      Take 1           [facility]

rs



     (ZOLOFT) 25      1-28                               tablet by           [facility]

 of



     mg tablet      00:00:                               mouth           [place]



               00                                 daily.           Medical



                                                                 [facility]

 

     aspirin 81      2016      Yes            81mg      Take 1           [facility]

rs



     mg chewable      1-28                               tablet by           [facility]

 of



     tablet      00:00:                               mouth           [place]



               00                                 daily.           Medical



                                                                 [facility]

 

     acetaminoph      2016      Yes            650mg      Take 2           [facility]

[facility]



     en        1-28                               tablets by           [facility] of



     (TYLENOL)      00:00:                               mouth           [place]



     325 mg      00                                 every 6           [facility]



     tablet                                         (six)           [facility]



                                                  hours as           



                                                  needed for           



                                                  Pain           



                                                  (scale           



                                                  1-3).           

 

     atorvastati      2016      Yes            20mg      Take 1           [facility]

ers



     n (LIPITOR)      1-28                               tablet by           [facility]

 of



     20 mg      00:00:                               mouth at           [place]



     tablet      00                                 bedtime.           Medical



                                                                 [facility]

 

     bisacodyl      2016      Yes            10mg      Take 2           [facility]

s



     (DULCOLAX)      1-28                               tablets by           [facility]

 of



     5 mg EC      00:00:                               mouth once           [place]

[facility]



     tablet      00                                 daily as           [facility]



                                                  needed for           [facility]



                                                  Constipati           



                                                  on.            

 

     docusate      2016      Yes            100mg      Take 1           [facility]

s



     (COLACE)      1-28                               capsule by           [facility] o

f



     100 mg      00:00:                               mouth 2           [place]



     capsule      00                                 (two)           Medical



                                                  times           [facility]



                                                  daily.           

 

     clopidogrel      2016      Yes            75mg      Take 1           [facility]

ers



     (PLAVIX) 75      1-28                               tablet by           [facility]

 of



     mg tablet      00:00:                               mouth           [place]



               00                                 daily.           Medical



                                                                 [facility]

 

     SERTraline      2016      Yes            25mg      Take 1           [facility]

rs



     (ZOLOFT) 25      1-28                               tablet by           [facility]

 of



     mg tablet      00:00:                               mouth           [place]



               00                                 daily.           Medical



                                                                 [facility]

 

     aspirin 81      2016      Yes            81mg      Take 1           [facility]

rs



     mg chewable      1-28                               tablet by           [facility]

 of



     tablet      00:00:                               mouth           [place]



               00                                 daily.           Medical



                                                                 Branch

 

     acetaminoph      2016      Yes            650mg      Take 2           Uni

vers



     en        1-28                               tablets by           ity of



     (TYLENOL)      00:00:                               mouth           [place]



     325 mg      00                                 every 6           Medical



     tablet                                         (six)           Branch



                                                  hours as           



                                                  needed for           



                                                  Pain           



                                                  (scale           



                                                  1-3).           

 

     atorvastati      2016      Yes            20mg      Take 1           Univ

ers



     n (LIPITOR)      1-28                               tablet by           ity

 of



     20 mg      00:00:                               mouth at           [place]



     tablet      00                                 bedtime.           Medical



                                                                 Branch

 

     bisacodyl      2016      Yes            10mg      Take 2           Univer

s



     (DULCOLAX)      1-28                               tablets by           ity

 of



     5 mg EC      00:00:                               mouth once           [place]

[place]



     tablet      00                                 daily as           Medical



                                                  needed for           Branch



                                                  Constipati           



                                                  on.            

 

     docusate      2016      Yes            100mg      Take 1           Univer

s



     (COLACE)      1-28                               capsule by           ity o

f



     100 mg      00:00:                               mouth 2           [place]



     capsule      00                                 (two)           Medical



                                                  times           Branch



                                                  daily.           

 

     clopidogrel      2016      Yes            75mg      Take 1           Univ

ers



     (PLAVIX) 75      -                               tablet by           ity

 of



     mg tablet      00:00:                               mouth           [place]



               00                                 daily.           Medical



                                                                 Branch

 

     SERTraline      2016      Yes            25mg      Take 1           Unive

rs



     (ZOLOFT) 25      -                               tablet by           ity

 of



     mg tablet      00:00:                               mouth           [place]



               00                                 daily.           Medical



                                                                 Branch

 

     SERTraline      2016- No             25mg      Take 1           Univ

ers



     (ZOLOFT) 25      - 10-19                          tablet by           it

y of



     mg tablet      00:00: 00:00                          mouth           [place]



               00   :00                           daily.           Medical



                                                                 Branch

 

     acetaminoph      2016- No             650mg      Take 2           Un

tanvi



     en        - 10-10                          tablets by           ity of



     (TYLENOL)      00:00: 00:00                          mouth           [place]



     325 mg      00   :00                           every 6           Medical



     tablet                                         (six)           Branch



                                                  hours as           



                                                  needed for           



                                                  Pain           



                                                  (scale           



                                                  1-3).           

 

     bisacodyl      2016- No             10mg      Take 2           Unive

rs



     (DULCOLAX)      1-28 10-10                          tablets by           it

y of



     5 mg EC      00:00: 00:00                          mouth once           Prasad

as



     tablet      00   :00                           daily as           Medical



                                                  needed for           Branch



                                                  Constipati           



                                                  on.            

 

     docusate      2016- No             100mg      Take 1           Unive

rs



     (COLACE)      -28 10-10                          capsule by           ity 

of



     100 mg      00:00: 00:00                          mouth 2           [place]



     capsule      00   :00                           (two)           Medical



                                                  times           Branch



                                                  daily.           

 

     clopidogreL      2016- No             1{tbl} QD   Take 1           M

ethodi



     (PLAVIX) 75      1- 04-05                          tablet by           st



     mg tablet      00:00: 00:00                          mouth           Hospit

a



               00   :00                           daily.           l







Vital Signs







             Vital Name   Observation Time Observation Value Comments     Source

 

             Respiratory rate 2022-10-19 20:32:00 22 /min                   [facility]

 

             Oxygen saturation in 2022-10-19 20:32:00 100 /min                  

[facility]



             Arterial blood by                                        [place] Medi

rj



             Pulse oximetry                                        Branch

 

             Systolic blood 2022-10-19 12:20:00 90 mm[Hg]                 Univer

sity of



             pressure                                            [facility]

 

             Diastolic blood 2022-10-19 12:20:00 64 mm[Hg]                 [facility]

[facility]



             [facility]

 

             Heart rate   2022-10-19 12:20:00 70 /min                   [facility]

 

             Body temperature 2022-10-19 12:20:00 36.67 Jennifer                 [facility]

[facility]

 

             Body weight  2022-10-16 13:00:00 70 kg                     [facility]

 

             BMI          2022-10-16 13:00:00 19.81 kg/m2               [facility]

 

             Body height  2022-10-09 17:57:00 188 cm                    [facility]







Procedures







                Procedure       Date / Time     Performing Clinician Source



                                Performed                       

 

                COVID-19 (ID NOW RAPID 2022-10-19 18:35:00 Edgar Byers  [facility]



                TESTING)                                        Medical Branch

 

                FL MODIFIED BARIUM SWALLOW 2022-10-18 14:59:32 Edgar Byers

[facility]

 

                MAGNESIUM       2022-10-18 09:06[facility]00 [facility]

 

                BASIC METABOLIC PANEL (NA, 2022-10-18 09:06:00 Edgar Byers  [facility]



                K, CL, CO2, GLUCOSE, BUN,                                 Medica

l Branch



                CREATININE, CA)                                 

 

                CBC WITH DIFF   2022-10-18 09:06:00 Edgar Byers  [facility]

 

                MAGNESIUM       2022-10-17 09:59:00 Edgar Byers  [facility]

 

                BASIC METABOLIC PANEL (NA, 2022-10-17 09:59:00 Edgar Byers  [facility]



                K, CL, CO2, GLUCOSE, BUN,                                 Medica

l [place]



                CREATININE, CA)                                 

 

                CBC WITH DIFF   2022-10-17 [facility]59[facility]

 

                XR ABDOMEN 1 VW 2022-10-16 14:45:57 [facility]

 

                PHOSPHORUS      2022-10-16 09:44:00 Abdullah, [facility]

 

                MAGNESIUM       2022-10-16 09:44:00 Ovlucila [facility]

 

                BASIC METABOLIC PANEL (NA, 2022-10-16 09:44:00 Ovlucila [facility]



                K, CL, CO2, GLUCOSE, BUN,                                 Medica

l Branch



                CREATININE, CA)                                 

 

                CBC WITH DIFF   2022-10-16 09:44:00 Ovlucila [facility]

 

                N-TERMINAL PRO-BNP 2022-10-16 09:44:00 Ovlucila [facility]

 

                MAGNESIUM       2022-10-15 10:30:00 Ovlucila [facility]

 

                BASIC METABOLIC PANEL (NA, 2022-10-15 10:30:00 Zeeshan, [facility]



                K, CL, CO2, GLUCOSE, BUN,                                 Medica

l Branch



                CREATININE, CA)                                 

 

                CBC WITH DIFF   2022-10-15 10:30:00 Zeeshan [facility]

 

                N-TERMINAL PRO-BNP 2022-10-15 10:30:00 Zeeshan [facility]

 

                MAGNESIUM       2022-10-14 09:34:00 Zeeshan [facility]

 

                BASIC METABOLIC PANEL (NA, 2022-10-14 09:34:00 Ovlucila [facility]



                K, CL, CO2, GLUCOSE, BUN,                                 Medica

l Branch



                CREATININE, CA)                                 

 

                CBC WITH DIFF   2022-10-14 09:34:00 Zeeshan [facility]

 

                N-TERMINAL PRO-BNP 2022-10-14 09:34:00 Zeeshan [facility]

 

                XR CHEST 1 VW   2022-10-13 11:59:18 Jeronimo Canales   [facility]

 

                PHOSPHORUS      2022-10-13 09:36:00 Aubrey [facility]

 

                MAGNESIUM       2022-10-13 09:36:00 Aubrey [facility]

 

                TROPONIN I      2022-10-13 09:36:00 Aubrey [facility]

 

                HEPATIC FUNCTION PANEL 2022-10-13 09:36:00 Graham Mina [facility]



                ([id number]) (ALB,T.PRO,BILI                                 Medical 

Branch



                T,BU/BC,ALT,AST,ALK PHOS)                                 

 

                BASIC METABOLIC PANEL (NA, 2022-10-13 09:36:00 Graham Mina [facility]



                K, CL, CO2, GLUCOSE, BUN,                                 Medica

l Branch



                CREATININE, CA)                                 

 

                CBC WITH DIFF   2022-10-13 09:36:00 Aubrey [facility]

 

                N-TERMINAL PRO-BNP 2022-10-13 09:36:00 Graham [facility]

 

                PHOSPHORUS      2022-10-12 08:47:00 [facility]

 

                MAGNESIUM       2022-10-12 08:47:00 Aubrey [facility]

 

                TROPONIN I      2022-10-12 08:47:00 Aubrey [facility]

 

                HEPATIC FUNCTION PANEL 2022-10-12 08:47:00 Graham Mina [facility]



                ([id number]) (ALB,T.PRO,[facility]



                T,BU/BC,ALT,AST,ALK PHOS)                                 

 

                BASIC METABOLIC PANEL (NA, 2022-10-12 08:47:00 Graham Mina [facility]



                K, CL, CO2, GLUCOSE, BUN,                                 Medica

l Branch



                CREATININE, CA)                                 

 

                CBC WITH DIFF   2022-10-12 08:47:00 Aubrey [facility]

 

                N-TERMINAL PRO-BNP 2022-10-12 08:47:00 Graham [facility]

 

                PHOSPHORUS      2022-10-11 11:04:00 Aubrey [facility]

 

                MAGNESIUM       2022-10-11 11:04:00 Aubrey [facility]

 

                TROPONIN I      2022-10-11 11:04:00 Aubrey [facility]

 

                HEPATIC FUNCTION PANEL 2022-10-11 11:04:00 Graham Mina [facility]



                ([id number]) ([facility]                                 [facility] 

Branch



                T,BU/BC,ALT,AST,ALK PHOS)                                 

 

                BASIC METABOLIC PANEL (NA, 2022-10-11 11:04:00 Graham Mina [facility]



                K, CL, CO2, GLUCOSE, BUN,                                 Medica

l Branch



                CREATININE, CA)                                 

 

                CBC WITH DIFF   2022-10-11 11:04:00 [facility]

 

                N-TERMINAL PRO-BNP 2022-10-11 11:04:00 Graham [facility]

 

                US RETROPERITONEAL 2022-10-10 20:10:00 Ally [facility]



                COMPLETE                                        [facility]

 

                TRANSTHORACIC ECHO (TTE) 2022-10-10 [facility]:05[facility]



                LIMITED W/ DOPPLER AND                                 Medical B

ranch



                COLOR                                           

 

                PHOSPHORUS      2022-10-10 11:23:00 Aubrey [facility]

 

                MAGNESIUM       2022-10-10 11:23:00 Aubrey [facility]

 

                CORTISOL AM     2022-10-10 11:23:00 [facility]

 

                TROPONIN I      2022-10-10 11:23:00 Aubrey [facility]

 

                HEPATIC FUNCTION PANEL 2022-10-10 11:23:00 Graham Mina [facility]



                ([id number]) (ALB,T.PRO,BILI                                 [facility] 

Branch



                T,BU/BC,ALT,AST,ALK PHOS)                                 

 

                BASIC METABOLIC PANEL (NA, 2022-10-10 11:23:00 Graham Mina [facility]



                K, CL, CO2, GLUCOSE, BUN,                                 Medica

l Branch



                CREATININE, CA)                                 

 

                CBC WITH DIFF   2022-10-10 11:23:00 Aubrey [facility]

 

                GLYCOSYLATED HEMOGLOBIN 2022-10-10 11:23:00 Jordan Hallnan  [facility]

[facility] [place]



                (A1C)                                           [facility]

 

                N-TERMINAL PRO-BNP 2022-10-10 11:23:00 Aubrey [facility]

 

                AC VBG + LACTIC ACID 2022-10-10 11:23:00 Daniel Hall  [facility]

 

                OSMOLALITY URINE 2022-10-10 06:04:00 Rafale [facility]

 

                URINE DRUG (IMMUNOASSAY) - 2022-10-10 06:04:00 Daniel Hall

[facility]



                COMPREHENSIVE DRUG SCREEN                                 [facility]

[facility]

 

                PNEUMOCOCCAL ANTIGEN 2022-10-10 06:04:00 Daniel Hall  [facility]

 

                URINE CULTURE   2022-10-10 06:04:00 Daniel Hall  [facility]

 

                UREA NITROGEN, URINE 2022-10-10 06:04:00 Daniel Hall  [facility]



                RANDOM                                          [facility]

 

                SODIUM, URINE RANDOM 2022-10-10 06:04:00 Rafael erica  [facility]

 

                PROTEIN CREAT RATIO URINE 2022-10-10 06:04:00 Daniel Hall  [facility]



                RANDOM                                          [facility]

 

                CREATINE KINASE 2022-10-10 05:30:00 Rafael erica  [facility]

 

                URIC ACID       2022-10-10 05:30:00 Rafael erica  [facility]

 

                FERRITIN SERUM  2022-10-10 05:30:00 Rafael erica  [facility]

 

                OSMOLALITY, SERUM OR 2022-10-10 05:30:00 Daniel Hall  [facility]



                PLASMA                                          [facility]

 

                VITAMIN B12, LEVEL 2022-10-10 05[facility]30[facility]

 

                FOLATE          2022-10-10 05:30:00 Rafael erica  [facility]

 

                TROPONIN I      2022-10-10 05:30:00 Rafael erica  [facility]

 

                THYROID STIMULATING [facility]22-10-10 [facility]:30[facility]00 [facility]



                HORMONE                                         [facility]

 

                BASIC METABOLIC PANEL (NA, 2022-10-10 05:30:00 Daniel Hall

[facility]



                K, CL, CO2, GLUCOSE, BUN,                                 [facility]

[facility]



                CREATININE, CA)                                 

 

                LIPID PANEL ([id number])(TOTAL 2022-10-10 05:30:00 Daniel Hall  [facility]



                CHOLESTEROL,                                    Medical [place]



                TRIGLYCERIDES, HDL)                                 

 

                IRON PANEL      2022-10-10 05:30:00 Rafael erica  [facility]

 

                MYCOPLASMA PNEUMONIAE 2022-10-10 05:30:00 Daniel Hall  [facility]



                ANTIBODY, IGM                                   [facility]

 

                VITAMIN D, 25-OH 2022-10-10 05:30:00 Rafael erica  [facility]

 

                PROCALCITONIN   2022-10-10 05:30:00 Rafael erica  [facility]

 

                RESPIRATORY PANEL BY PCR 2022-10-10 05:29:00 Daniel Hall  [facility]

 

                AC PANEL 20 + LACTIC ACID 2022-10-10 05:20:00 Daniel Hall  [facility]

 

                URINALYSIS      2022-10-09 21:45:00 Ally [facility]

 

                CREATININE, URINE RANDOM 2022-10-09 21:45:00 Ally [facility]

 

                TOTAL PROTEIN, URINE 2022-10-09 21:45:00 Ally [facility]

 

                UREA NITROGEN, URINE 2022-10-09 21:45:00 Ally [facility]

 

                XR HUMERUS 2 VW LEFT 2022-10-09 17:12:47 Yi Woodard [facility]

 

                XR CHEST 1 VW   2022-10-09 15:03:12 Yi Woodard [facility]

 

                AC PANEL 21 + LACTIC ACID 2022-10-09 14:55:00 Yi Woodard

 [facility]

 

                LIPASE          2022-10-09 14:49:00 Yi Woodard [facility]

 

                TROPONIN I      2022-10-09 14:49:00 Yi Woodard [facility]

 

                COMP. METABOLIC PANEL 2022-10-09 14:49:00 Yi Woodard [facility]



                ([id number])                                         [facility]

 

                DIFF CONSULT    2022-10-09 14:49:00 Daniel Hall  [facility]

 

                CBC WITH DIFF   2022-10-09 14:49:00 Yi Woodard [facility]

 

                PROTHROMBIN TIME / INR 2022-10-09 14:49:00 Yi Woodard [facility]

 

                ACTIVATED PARTIAL THRMPLAS 2022-10-09 14:49:00 Yi Woodard [facility]

 

                N-TERMINAL PRO-BNP 2022-10-09 14:49:00 [facility]

 

                COVID-19 (ID NOW RAPID 2022-10-09 14:49:00 Yi Woodard Un

[facility]



                TESTING)                                        Medical Branch

 

                LAB ONLY COVID  2022-10-09 14:49:00 Yi Woodard [facility]



                INTERPRETATION                                  [facility]

 

                HB ECG ROUTINE & RHYTHM 2022-10-09 14:39:07 Yi Woodard U

[facility]

 

                XR HUMERUS LEFT 2021 16:44:41 Armand Solorio   [facility]

spital

 

                XR SHOULDER 2+ VW LEFT 2021 14:43:58 Armand Solorio   [facility]

 

                XR UPPER EXTREMITY 2021 16:45:26 Armand Solorio   [facility]



                EXTERNAL STUDY                                  







Plan of Care







             Planned Activity Planned Date Details      Comments     Source

 

             Future Scheduled 2022   INFLUENZA VACCINE (#1)              C

[facility]



             Test         00:00:00     [code = INFLUENZA              Medical Ce

nter



                                       VACCINE (#1)]              

 

             Future Scheduled 2022   HEPATITIS B VACCINES              Met

[facility]



             Test         17:48:09     (1 of 3 - 3-dose              



                                       series) [code =              



                                       HEPATITIS B VACCINES              



                                       (1 of 3 - 3-dose              



                                       series)]                  

 

             Future Scheduled 2022   SHINGLES VACCINES (1              Met

[facility]



             Test         17:48:09     of 2) [code = SHINGLES              



                                       VACCINES (1 of 2)]              

 

             Future Scheduled 2022   65+ PNEUMOCOCCAL              Methodi

st Hospital



             Test         17:48:09     VACCINE (1 - PCV)              



                                       [code = 65+               



                                       PNEUMOCOCCAL VACCINE              



                                       (1 - PCV)]                

 

             Future Scheduled 2022   COVID-19 VACCINE (2 -              [facility]

[facility]



             Test         17:48:09     Moderna series) [code              



                                       = COVID-19 VACCINE (2              



                                       - Moderna series)]              

 

             Future Scheduled 2022   INFLUENZA VACCINE              Method

ist Hospital



             Test         17:48:09     [code = INFLUENZA              



                                       VACCINE]                  

 

             Future Scheduled 2022   DEPRESSION SCREENING              [facility]



             Test         00:00:00     (12+) [code =              Medical Center



                                       DEPRESSION SCREENING              



                                       (12+)]                    

 

             Future Scheduled 2022   FALLS RISK SCREENING              [facility]



             Test         00:00:00     [code = FALLS RISK              Medical C

enter



                                       SCREENING]                

 

             Future Scheduled 2005   MEDICARE ANNUAL              [facility]

[facility]



             Test         00:00:00     WELLNESS (YEAR 2 or              Medical 

Center



                                       FIRST YEAR if no IPPE)              



                                       [code = MEDICARE              



                                       ANNUAL WELLNESS (YEAR              



                                       2 or FIRST YEAR if no              



                                       IPPE)]                    

 

             Future Scheduled 2004   PNEUMOCOCCAL 65+ YRS              [facility]



             Test         00:00:00     (1 - PCV) [code =              Medical Ce

nter



                                       PNEUMOCOCCAL 65+ YRS              



                                       (1 - PCV)]                

 

             Future Scheduled 1989   SHINGLES VACCINES (1              [facility]



             Test         00:00:00     of 2) [code = SHINGLES              Medic

al Center



                                       VACCINES (1 of 2)]              

 

             Future Scheduled 1958   DTAP/TDAP/TD VACCINES              [facility]



             Test         00:00:00     (1 - Tdap) [code =              Medical C

enter



                                       DTAP/TDAP/TD VACCINES              



                                       (1 - Tdap)]               

 

             Future Scheduled 1939   COVID-19 VACCINE (#1)              [facility]

[facility]



             Test         00:00:00     [code = COVID-19              Medical Myrtle

ter



                                       VACCINE (#1)]              







Encounters







        Start   End     Encounter Admission Attending Care    Care    Encounter 

Source



        Date/Time Date/Time Type    Type    Clinicians Facility Department ID   

   

 

        2022-10-09         [facility]    [id number] 

[facility]



        [facility]50[facility]47                         [facility]

 

        [facility]         Inpatient UR              [facility]   Orthopedics [id number]

[address] [address]



        02:50:15                                                         [facility]

 

        2022-10-20 2022-10-20 Transition         ELIE Small  1.2.840.114 [id number]

[id number] Univers



        00:00:00 00:00:00 of Care         El FATIMA   350.1.13.10         

[place]   4.2.7.2.686         [place]

[place]



                                                        [phone]         [address]

 

        2022-10-09 2022-10-19 [facility]     [id number]

[facility]



        09:36:00 15:54:00                 EDGAR smart [facility]

 

        2022-10-09 2022-10-19 [facility]         Yi Woodard [facility]    1.2.84

0.114 [id number] 

[facility]



        09:36:00 15:54:00 Encounter         Graham Mina 350.1.13.10

         ity Edgar Villalpando 4.2.7.2.686      

   [facility]  [phone]         Medi

rj



                                                        080             Branch

 

        2022 [facility]          [place], 1.2.840.1 [id number]

[id number] Methodi



        00:00:00 00:00:00 Only            Idalmis 59087.1.1         [address]     [address]



                                                3.430.2.7                 Hospit

a



                                                .3.697134                 l



                                                .8                      

 

        2022 Office          Armand Solorio 1.2.840.1 [id number]

[id number] Methodi



        10:10:00 10:10:00 Visit           B.      61597.1.1         [address]     [address]



                                                3.430.2.7                 Hospit

a



                                                .3.835307                 l



                                                .8                      

 

        2022 Outpatient         [facility]     2100

[id number] [place]



        00:00:00 00:00:00                                         803     Method

i



                                                                        st

 

        2022 Travel                  1.2.840.1 1.2.124.329 4098

[id number] Methodi



        00:00:00 00:00:00                         96049.1.1 350.1.13.43 899     

st



                                                3.430.2.7 0.2.7.3.698         \[facility] [place] [facility]\""



                                                .3.461956 084.8           l



                                                .8                      

 

        2022 Telephone         Antonio 1.2.840.1 [id number] 21

[id number] 

Methodi



        00:00:00 00:00:00                 Wanda   27764.1.1         [address]     st



                                                3.430.2.7                 Hospit

a



                                                .3.861293                 l



                                                .8                      

 

        2021 Office          Armand Solorio 1.2.840.1 [id number] 210

[id number] Methodi



        10:20:00 11:28:22 Visit           B.      81579.1.1         [address]     st



                                                3.430.2.7                 Hospit

a



                                                .3.901784                 l



                                                .8                      

 

        2021 Travel                  1.2.840.1 1.2.347.682 8819

[id number] Methodi



        00:00:00 00:00:00                         89926.1.1 350.1.13.43 628     

st



                                                3.430.2.7 0.2.7.3.698         Ho

spita



                                                .3.462607 084.8           l



                                                .8                      

 

        2021 Outpatient         [facility]     2100

[id number] [place]



        00:00:00 00:00:00                                         781     Method

i



                                                                        st

 

        2021 Outpatient         [facility]     

[id number] [place]



        00:00:00 00:00:00                                         394     Method

i



                                                                        st

 

        2021 Office          Armand Solorio 1.2.840.1 [id number] 210

[id number] Methodi



        09:10:00 09:32:12 Visit           B.      07145.1.1         [address]     st



                                                3.430.2.7                 Hospit

a



                                                .3.899486                 l



                                                .8                      

 

        2021 Travel                  1.2.840.1 1.2.509.675 2906

[id number] Methodi



        00:00:00 00:00:00                         06187.1.1 350.1.13.43 819     

st



                                                3.430.2.7 0.2.7.3.698         Ho

spita



                                                .3.657885 084.8           l



                                                .8                      

 

        2021 Telephone         Antonio 1.2.840.1 [id number] 21

[id number] 

Methodi



        00:00:00 00:00:00                 Wanda   33271.1.1         [address]     st



                                                3.430.2.7                 Hospit

a



                                                .3.203251                 l



                                                .8                      

 

        2021 Outpatient         [facility]     2100

[id number] [place]



        00:00:00 00:00:00                                         717     Method

i



                                                                        st

 

        2021 Outpatient         [facility]     2100

[id number] [place]



        00:00:00 00:00:00                                         968     Method

i



                                                                        st

 

        2021 [facility]         Merle Solorioy 1.2.840.1 [id number] 21

[id number] 

Methodi



        15:20:44 23:59:00 Encounter         B.      72559.1.1         754     st



                                                3.430.2.7                 Hospit

a



                                                .3.527497                 l



                                                .8                      

 

        2021 Office          Armand Solorio 1.2.840.1 [id number] 210

[id number] Methodi



        15:00:00 16:06:02 Visit           B.      82164.1.1         087     st



                                                3.430.2.7                 Hospit

a



                                                .3.920395                 l



                                                .8                      

 

        2021 Outpatient         [facility]     2100

[id number] [place]



        00:00:00 00:00:00                                         754     Method

i



                                                                        st

 

        2021 [facility]          Merle Solorioy 1.2.840.1 [id number] 

[id number] Methodi



        00:00:00 00:00:00 Only            B.      74470.1.1         753     st



                                                3.430.2.7                 Hospit

a



                                                .3.209458                 l



                                                .8                      

 

        2021 Outpatient         [facility]     2100

[id number] [place]



        00:00:00 00:00:00                                         087     Method

i



                                                                        st

 

        2021 Travel                  1.2.840.1 1.2.297.534 7074

[id number] Methodi



        00:00:00 00:00:00                         65119.1.1 350.1.13.43 513     

st



                                                3.430.2.7 0.2.7.3.698         Ho

spita



                                                .3.392209 084.8           l



                                                .8                      

 

        2021 Travel                  1.2.840.1 1.2.364.477 0276

113224 Methodi



        00:00:00 00:00:00                         28127.1.1 350.1.13.43 219     

st



                                                3.430.2.7 0.2.7.3.698         Ho

spita



                                                .3.592843 084.8           l



                                                .8                      

 

        2021 Letter          Rustam, [facility]    1.2.840.114 [id number]

20 Univers



        00:00:00 00:00:00 (Out)           Helder Jarrett  350.1.13.10         i

ty of



                                                City    4.2.7.2.686         [place]

[place]



                                                [place]  [phone]         [address]



                                                [place]                   

 

        2021 Laboratory         Lab, Adc Fam Pob I [facility]    1.2.

840.114 [id number] 

Univers



        09:24:45 09:44:45 Only            [facility]  350.1.13.10 

        ity of



                                                [place] 4.2.7.2.686         Prasad

as



                                                [place] [phone]         [address]



                                                Office                  



                                                Building                 



                                                One                     

 

        2021 Outpatient R       BRIAN [facility]    [id number]

4008 Univers



        09:20:00 09:20:00                 ANETTE smart o

f



                                                                        [facility]

 

        2021 Letter          Doctor GARZA    1.2.840.114 [id number]

46 Univers



        00:00:00 00:00:00 (Out)           Unassigned, CHRISTOPHER   350.1.13.10       

  ity of



                                        [facility] 42.7.2.686         Prasad

as



                                                        [phone]         [address]

 

        2021 Letter          Doctor GARZA    1.2.840.114 [id number]

45 Univers



        00:00:00 00:00:00 (Out)           Unassigned, CHRISTOPHER   350.1.13.10       

  ity of



                                        [facility] 42.7.2.686         Prasad

as



                                                        [phone]         Medi

rj



                                                        044             Branch







Results







           Test Description Test Time  Test Comments Results    Result Comments 

Source









                    MYCOPLASMA PNEUMONIAE ANTIBODY, IGM 2022-10-12 21:09:47 









                      Test Item  Value      Reference Range Interpretation Comme

nts









             Mycoplasma IGM (test 0.09 U/L     See_Comment               INTERPR

ETIVE INFORMATION: ?Mycoplasma



             code = 5256-3)                                        pneumoniae Ab

, IgM ?0.76 U/L or less



                                                                 .......... Nega

tive: No clinically ? ?



                                                                 ? ? ? ? ? ? ? ?

 ? ? ? ? ?significant



                                                                 amount of ? ? ?

 ? ? ? ? ? ? ? ? ? ? ?



                                                                 ?M. pneumoniae 

IgM antibody ? ? ? ? ?



                                                                 ? ? ? ? ? ? ? ?

 ? ?detected. ?0.77 -



                                                                 0.95 U/L ......

..... Low Positive: M.



                                                                 pneumoniae- ? ?

 ? ? ? ? ? ? ? ? ? ? ?



                                                                 ? ?specific IgM

 presumptively ? ? ? ?



                                                                 ? ? ? ? ? ? ? ?

 ? ? ?detected.



                                                                 Collection of a

 ? ? ? ? ? ? ? ? ? ? ?



                                                                 ? ? ? ?follow-u

p sample in 1-2 ? ? ? ?



                                                                 ? ? ? ? ? ? ? ?

 ? ? ?weeks is



                                                                 recommended to 

? ? ? ? ? ? ? ? ? ? ? ?



                                                                 ? ? ?assure ascencion

ctivity. ?0.96 U/L or



                                                                 greater .......

 Positive: Highly



                                                                 significant ? ?

 ? ? ? ? ? ? ? ? ? ? ?



                                                                 ? ?amount of M.

 pneumoniae- ? ? ? ? ?



                                                                 ? ? ? ? ? ? ? ?

 ? ?specific IgM



                                                                 antibody ? ? ? 

? ? ? ? ? ? ? ? ? ? ?



                                                                 ?detected. Vega

nancy, low levels ? ? ? ?



                                                                 ? ? ? ? ? ? ? ?

 ? ? ?of IgM antibodies



                                                                 may ? ? ? ? ? ?

 ? ? ? ? ? ? ? ?



                                                                 ?occasionally p

ersist for more ? ? ? ?



                                                                 ? ? ? ? ? ? ? ?

 ? ? ?than 12 months



                                                                 post-infection.

Performed By: [id number] [place] [address] [id number]

aboratory Director:



                                                                 Jordin scott MD, PhD [Automated



                                                                 message] The sy

stem which generated



                                                                 this result tra

nsmitted reference



                                                                 range: <=0.76. 

The reference range was



                                                                 not used to int

erpret this result as



                                                                 normal/abnormal

.



[facility]52:12
ABSOLUTE LYMPHOPENIA, ABSOLUTE MONOCYTOPENIA, AND ABSOLUTE EOSINOPENIA. DOHLE 
BODIES IDENTIFIED. MACROCYTIC, NORMOCHROMIC ANEMIA. THROMBOCYTOSIS.[facility]IN2022-[facility]-10 23:47:18





             Test Item    Value        Reference    Interpretation Comments



                                       Range                     

 

             Procalcitonin (test 3.25 ng/mL   See_Comment  H             [Automa

batsheva



             code = 0616014490)                                        message] 

The



                                                                 system which



                                                                 generated this



                                                                 result



                                                                 transmitted



                                                                 reference



                                                                 range: <=0.08.



                                                                 The reference



                                                                 range was not



                                                                 used to



                                                                 interpret this



                                                                 result as



                                                                 normal/abnormal



                                                                 .

 

             KRYSTLE (test code = INTERPRETATION OF                           



             KRYSTLE)         PROCALCITONIN RESULTS                           



                          IN ADULTS >= 18 YEARS                           



                          OF AGE Initiation and                           



                          discontinuation of                           



                          antibiotics on                           



                          patients with                           



                          suspected or confirmed                           



                          Lower Respiratory                           



                          Tract Infection in                           



                          Adults >= 18 years of                           



                          age.                                   



                          +--------------+------                           



                          ----------+-----------                           



                          ----+-----------------                           



                          -----------+|Procalcit                           



                          onin |Interpretation                           



                          ?|Antibiotic ? ?                           



                          |Considerations ? ? ?                           



                          ? ? ? ? |ng/mL ? ? ? ?                           



                          | ? ? ? ? ? ? ?                           



                          ?|recommendation | ? ?                           



                          ? ? ? ? ? ? ? ? ? ? ?                           



                          ?                                      



                          +--------------+------                           



                          ----------+-----------                           



                          ----+-----------------                           



                          -----------+| <0.1 ? ?                           



                          ? ? | Bacterial ? ? ?|                           



                          Strongly ? ? ?| ? ? ?                           



                          ? ? ? ? ? ? ? ? ? ? ?                           



                          | ? ? ? ? ? ? ?|                           



                          infection very |                           



                          discouraged ? |                           



                          Overruling: ? ? ? ? ?                           



                          ? ? ? | ? ? ? ? ? ? ?|                           



                          unlikely ? ? ? | ? ? ?                           



                          ? ? ? ? | ? Clinically                           



                          unstable ? ? ?                           



                          +--------------+------                           



                          ----------+-----------                           



                          ----+ ? High risk for                           



                          adverse ? ? | <0.25 ?                           



                          ? ? ?| Bacterial ? ?                           



                          ?| Discouraged ? | ?                           



                          outcome ? ? ? ? ? ? ?                           



                          ? ? | ? ? ? ? ? ? ?|                           



                          infection ? ? ?| ? ? ?                           



                          ? ? ? ? | ? SEE                           



                          IMPORTANT NOTE ? ? ?                           



                          ?| ? ? ? ? ? ? ?|                           



                          unlikely ? ? ? | ? ? ?                           



                          ? ? ? ? | ? ? ? ? ? ?                           



                          ? ? ? ? ? ? ? ?                           



                          +--------------+------                           



                          ----------+-----------                           



                          ----+-----------------                           



                          -----------+| >=0.25 ?                           



                          ? ? | Bacterial ? ? ?|                           



                          Encouraged ? ?| ? ? ?                           



                          ? ? ? ? ? ? ? ? ? ? ?                           



                          | ? ? ? ? ? ? ?|                           



                          infection ? ? ?| ? ? ?                           



                          ? ? ? ? | ? ? ? ? ? ?                           



                          ? ? ? ? ? ? ? ? | ? ?                           



                          ? ? ? ? ?| likely ? ?                           



                          ? ? | ? ? ? ? ? ? ? |                           



                          Consider treatment                           



                          failure                                



                          ?+--------------+-----                           



                          -----------+----------                           



                          -----+ if levels does                           



                          not decrease | >0.5 ?                           



                          ? ? ? | Bacterial ? ?                           



                          ?| Strongly ? ? ?|                           



                          appropriately ? ? ? ?                           



                          ? ? ? | ? ? ? ? ? ? ?|                           



                          infection very |                           



                          encouraged ? ?| ? ? ?                           



                          ? ? ? ? ? ? ? ? ? ? ?                           



                          | ? ? ? ? ? ? ?|                           



                          likely ? ? ? ? | ? ? ?                           



                          ? ? ? ? | ? ? ? ? ? ?                           



                          ? ? ? ? ? ? ? ?                           



                          +--------------+------                           



                          ----------+-----------                           



                          ----+-----------------                           



                          -----------+                           



                          Discontinuation of                           



                          antibiotics in                           



                          high-acuity patients                           



                          with suspected or                           



                          confirmed sepsis in                           



                          Adults >= 18 years of                           



                          age.                                   



                          +--------------+------                           



                          ----------+-----------                           



                          ----+-----------------                           



                          -----------+|Procalcit                           



                          onin |Interpretation                           



                          ?|Antibiotic ? ?                           



                          |Considerations ? ? ?                           



                          ? ? ? ? |ng/mL ? ? ? ?                           



                          | ? ? ? ? ? ? ?                           



                          ?|recommendation | ? ?                           



                          ? ? ? ? ? ? ? ? ? ? ?                           



                          ?                                      



                          +--------------+------                           



                          ----------+-----------                           



                          ----+-----------------                           



                          -----------+| <0.25 ?                           



                          ? ? ?| Bacterial ? ?                           



                          ?| Strongly ? ? ?| ? ?                           



                          ? ? ? ? ? ? ? ? ? ? ?                           



                          ? | ? ? ? ? ? ? ?|                           



                          infection very |                           



                          discouraged ? |                           



                          Overruling: ? ? ? ? ?                           



                          ? ? ? | ? ? ? ? ? ? ?|                           



                          unlikely ? ? ? | ? ? ?                           



                          ? ? ? ? | ? Clinically                           



                          unstable ? ? ?                           



                          +--------------+------                           



                          ----------+-----------                           



                          ----+ ? High risk for                           



                          adverse ? ? | <0.5 or                           



                          drop | Bacterial ? ?                           



                          ?| Discouraged ? | ?                           



                          outcome ? ? ? ? ? ? ?                           



                          ? ? | >80% from ? ?|                           



                          infection ? ? ?| ? ? ?                           



                          ? ? ? ? | ? SEE                           



                          IMPORTANT NOTE ? ? ?                           



                          ?| highest PCT ?|                           



                          unlikely ? ? ? | ? ? ?                           



                          ? ? ? ? | ? ? ? ? ? ?                           



                          ? ? ? ? ? ? ? ? |                           



                          level ? ? ? ?| ? ? ? ?                           



                          ? ? ? ?| ? ? ? ? ? ? ?                           



                          | ? ? ? ? ? ? ? ? ? ?                           



                          ? ? ? ?                                



                          +--------------+------                           



                          ----------+-----------                           



                          ----+-----------------                           



                          -----------+| >=0.5 ?                           



                          ? ? ?| Bacterial ? ?                           



                          ?| Encouraged ? ?| ? ?                           



                          ? ? ? ? ? ? ? ? ? ? ?                           



                          ? | ? ? ? ? ? ? ?|                           



                          infection ? ? ?| ? ? ?                           



                          ? ? ? ? | ? ? ? ? ? ?                           



                          ? ? ? ? ? ? ? ? | ? ?                           



                          ? ? ? ? ?| likely ? ?                           



                          ? ? | ? ? ? ? ? ? ? |                           



                          Consider treatment                           



                          failure                                



                          ?+--------------+-----                           



                          -----------+----------                           



                          -----+ if levels does                           



                          not decrease | >1.0 ?                           



                          ? ? ? | Bacterial ? ?                           



                          ?| Strongly ? ? ?|                           



                          appropriately ? ? ? ?                           



                          ? ? ? | ? ? ? ? ? ? ?|                           



                          infection very |                           



                          encouraged ? ?| ? ? ?                           



                          ? ? ? ? ? ? ? ? ? ? ?                           



                          | ? ? ? ? ? ? ?|                           



                          likely ? ? ? ? | ? ? ?                           



                          ? ? ? ? | ? ? ? ? ? ?                           



                          ? ? ? ? ? ? ? ?                           



                          +--------------+------                           



                          ----------+-----------                           



                          ----+-----------------                           



                          -----------+                           



                          Percentage of drop of                           



                          Procalcitonin                           



                          calculation for                           



                          Discontinuation of                           



                          antibiotics in                           



                          high-acuity patients                           



                          with suspected or                           



                          confirmed sepsis in                           



                          Adults >= 18 years of                           



                          age. ? ? ? ? ? ? ? ? ?                           



                          ? ? Procalcitonin                           



                          highest{}-Procalcitoni                           



                          n current{}Delta                           



                          Procalcitonin =                           



                          ______________________                           



                          ______________________                           



                          ___ x100% ? ? ? ? ? ?                           



                          ? ? ? ? ? ? ? ? ? ?                           



                          Procalcitonin current                           



                          {} IMPORTANT NOTE:                           



                          Procalcitonin may be                           



                          elevated without                           



                          bacterial infection by                           



                          physiologic stress                           



                          related to trauma,                           



                          burns, chronic                           



                          dialysis, metastatic                           



                          cancer, surgery in the                           



                          past seven days,                           



                          malaria, some fungal                           



                          infections, and some                           



                          forms of vasculitis.                           



                          The interpretation                           



                          algorithm may not                           



                          apply to patients with                           



                          immunosuppression                           



                          (equivalent of >10 mg                           



                          of prednisone daily),                           



                          HIV with CD4 cell                           



                          count < 350 cells/mm3,                           



                          active malignancy on                           



                          systemic chemotherapy,                           



                          solid organ transplant                           



                          or hematopoietic stem                           



                          cell transplantation,                           



                          or hospital acquired                           



                          pneumonia.                             



                          Additionally, some                           



                          clinical trials of                           



                          procalcitonin have                           



                          excluded patients with                           



                          shock requiring                           



                          vasopressor use, acute                           



                          respiratory failure                           



                          requiring mechanical                           



                          ventilation, or those                           



                          with known lung                           



                          abscess/empyema. For                           



                          further information                           



                          please refer                           



                          to:[email or web address]                           



                          [email or web address]                           



                          p                                      

 

             Lab Interpretation Abnormal                               



             (test code =                                        



             59484-3)                                            



[facility]





             Test Item    Value        Reference Range Interpretation Comments

 

             EVELIO AM (test code = 48.9 ug/dL   4.5-23       H            



             3220767160)                                         

 

             KRYSTLE (test code = KRYSTLE) Biotin has been                           



                          reported to cause a                           



                          positive bias,                           



                          interpret results                           



                          relative to                            



                          patient's use of                           



                          biotin.                                

 

             Lab Interpretation (test Abnormal                               



             code = 77406-3)                                        



[facility]53





             Test Item    Value        Reference Range Interpretation Comments

 

             Height (test code =              in                        



             5050630712)                                         

 

             Weight (test code =              lbs                       



             7650392869)                                         

 

             Systolic BP (test code =              mmHg                      



             6811891608)                                         

 

             Diastolic BP (test code              mmHg                      



             = 7472820249)                                        

 

             Heart Rate (test code =              bpm                       



             9274062068)                                         

 

             BSA (test code = 1.94 m2                                



             0385601592)                                         

 

             TR Peak Oral (test code = 299.2 cm/s                             



             6898808516)                                         

 

             Triscuspid Valve              mmHg                      



             Regurgitation Peak                                        



             Gradient (test code =                                        



             7892280318)                                         

 

             LVIDD (test code = 3.50 cm                                



             6348813018)                                         

 

             Left Ventricular End 51.0 mL                                



             Diastolic Volume by                                        



             Teichholz Method (test                                        



             code = 1980530)                                        

 

             IVS (test code = 1.04 cm                                



             9264099749)                                         

 

             Interventricular Septum 1.04 cm                                



             Diastolic Thickness by                                        



             2D (test code = 0264716)                                        

 

             LVPWD (test code = 0.94 cm                                



             4052649424)                                         

 

             PW (test code = 0.94 cm      0.6-1.1                   



             5599994355)                                         

 

             EF(Teich) (test code = 63.60 %                                



             9651094696)                                         

 

             LVIDS (test code = 2.32 cm                                



             1206350887)                                         

 

             Left Ventricular End 18.6 mL                                



             Systolic Volume by                                        



             Teichholz Method (test                                        



             code = 8489978)                                        

 

             FS (test code = 34 %                                   



             1725662355)                                         

 

             EF - 2D (test code = 63.60 %                                



             38952550)                                           

 

             Radiology Study                                        



             observation (narrative)                                        



             (test code = 18782-3)                                        

 

                          KRYSTLE (test code = KRYSTLE)     

                                                           



                           ?Left?Ventricle:                           



                          Left ventricle size                           



                          is normal. Normal                           



                          wall thickness.                           



                          Normal wall motion.                           



                          Hyperdynamic systolic                           



                          function with a                           



                          visually estimated EF                           



                          of 65 - 70%. Unable                           



                          to assess diastolic                           



                          function due to                           



                          inadequate/inaccruate                           



                                                    Tissue Doppler data.

                                                           



                           ?Right?Ventricle:                           



                          Right ventricle is                           



                          mildly dilated.                           



                          Normal systolic                           



                                                    function.

                                                           



                           ?Tricuspid?Valve:                           



                          Right ventricular                           



                          systolic pressure is                           



                          35 mmHg + RA                           



                                                    pressure.

                                                           



                           ?IVC/SVC: IVC was                           



                                                    not well visualized.

                                                           



                           ?Pericardium: Small                           



                          pericardial effusion                           



                          present. No                            



                          indication of cardiac                           



                          tamponade. Left                           



                          VentricleLeft                           



                          ventricle size is                           



                          normal. Normal wall                           



                          thickness. Normal                           



                          wall motion.                           



                          Hyperdynamic systolic                           



                          function with a                           



                          visually estimated EF                           



                          of 65 - 70%. Unable                           



                          to assess diastolic                           



                          function due to                           



                          inadequate/inaccruate                           



                          Tissue Doppler                           



                          data.Right                             



                          VentricleRight                           



                          ventricle is mildly                           



                          dilated. Normal                           



                          systolic                               



                          function.Left                           



                          AtriumLeft atrium                           



                          size is normal.Right                           



                          AtriumRight atrium                           



                          size is                                



                          normal.IVC/SVCIVC was                           



                          not well                               



                          visualized.Mitral                           



                          ValveMitral valve                           



                          structure is normal.                           



                          Trace transvalvular                           



                          regurgitation.Tricusp                           



                          id ValveTricuspid                           



                          valve structure is                           



                          normal. Trace                           



                          transvalvular                           



                          regurgitation. Right                           



                          ventricular systolic                           



                          pressure is 35 mmHg +                           



                          RA pressure.Aortic                           



                          ValveAortic valve                           



                          structure is                           



                          normal.Pulmonic                           



                          ValveNot well                           



                          visualized.Ascending                           



                          AortaNormal sized                           



                          aorta.PericardiumSmal                           



                          l pericardial                           



                          effusion present. No                           



                          indication of cardiac                           



                          tamponade.Study                           



                          DetailsStudy quality                           



                          was technically                           



                          limited. A limited                           



                          echocardiogram was                           



                          performed using 2D,                           



                          color flow Doppler                           



                          and spectral Doppler.                           



                          The parasternal and                           



                          subcostal views were                           



                          obtained.                              



[facility]36





             Test Item    Value        Reference Range Interpretation Comments

 

             VIT B12 (test code =              240-930      H            



             2944943288)                                         

 

             KRYSTLE (test code = KRYSTLE) Biotin has been                           



                          reported to cause a                           



                          positive bias,                           



                          interpret results                           



                          relative to                            



                          patient's use of                           



                          biotin.                                

 

             Lab Interpretation (test Abnormal                               



             code = 90185-2)                                        



[facility]





             Test Item    Value        Reference Range Interpretation Comments

 

             FOLATE SER (test code = 5.4 ng/mL    3-20                      Biot

in has been



             [id number])                                         reported to cau

se a



                                                                 positive bias,



                                                                 interpret resul

ts



                                                                 relative to



                                                                 patient's use o

f



                                                                 biotin.

 

             Lab Interpretation (test Normal                                 



             code = 48170-3)                                        



[facility] 16:38:45





             Test Item    Value        Reference Range Interpretation Comments

 

             VIT D 25OH (test code = 30 ng/mL     25-80                     



             26039-8)                                            

 

             KRYSTLE (test code = KRYSTLE) Deficiency: <20                           



                          ng/mLInsufficiency                           



                          : 20-24                                



                          ng/mLOptimal:                           



                          25-80 ng/mL                            

 

             Lab Interpretation (test Normal                                 



             code = 50644-7)                                        



[facility]26





             Test Item    Value        Reference Range Interpretation Comments

 

             WBC (test code =              See_Comment                [Automated



             6690-2)                                             message] The



                                                                 system which



                                                                 generated this



                                                                 result



                                                                 transmitted



                                                                 reference range

:



                                                                 4.20 - 10.70



                                                                 10*3/?L. The



                                                                 reference range



                                                                 was not used to



                                                                 interpret this



                                                                 result as



                                                                 normal/abnormal

.

 

             RBC (test code =              See_Comment  L             [Automated



             089-8)                                              message] The



                                                                 system which



                                                                 generated this



                                                                 result



                                                                 transmitted



                                                                 reference range

:



                                                                 4.26 - 5.52



                                                                 10*6/?L. The



                                                                 reference range



                                                                 was not used to



                                                                 interpret this



                                                                 result as



                                                                 normal/abnormal

.

 

             HGB (test code = 10.4 g/dL    12.2-16.4    L            



             718-7)                                              

 

             HCT (test code = 32.2 %       38.4-49.3    L            



             4544-3)                                             

 

             MCV (test code = 98.8 fL      81.7-95.6    H            



             787-2)                                              

 

             MCH (test code = 31.9 pg      26.1-32.7                 



             785-6)                                              

 

             MCHC (test code = 32.3 g/dL    31.2-35                   



             786-4)                                              

 

             RDW-SD (test code = 54.2 fL      38.5-51.6    H            



             93603-8)                                            

 

             RDW-CV (test code = 14.8 %       12.1-15.4                 



             788-0)                                              

 

             PLT (test code =              See_Comment  H             [Automated



             777-3)                                              message] The



                                                                 system which



                                                                 generated this



                                                                 result



                                                                 transmitted



                                                                 reference range

:



                                                                 150 - 328



                                                                 10*3/?L. The



                                                                 reference range



                                                                 was not used to



                                                                 interpret this



                                                                 result as



                                                                 normal/abnormal

.

 

             MPV (test code = 10.9 fL      9.8-13                    



             50902-3)                                            

 

             NRBC/100 WBC (test              See_Comment                [Automat

ed



             code = 8180905720)                                        message] 

The



                                                                 system which



                                                                 generated this



                                                                 result



                                                                 transmitted



                                                                 reference range

:



                                                                 0.0 - 10.0 /100



                                                                 WBCs. The



                                                                 reference range



                                                                 was not used to



                                                                 interpret this



                                                                 result as



                                                                 normal/abnormal

.

 

             NRBC x10^3 (test code              See_Comment                [Auto

mated



             = 0861694712)                                        message] The



                                                                 system which



                                                                 generated this



                                                                 result



                                                                 transmitted



                                                                 reference range

:



                                                                 10*3/?L. The



                                                                 reference range



                                                                 was not used to



                                                                 interpret this



                                                                 result as



                                                                 normal/abnormal

.

 

             GRAN MAT (NEUT) % 88.0 %                                 



             (test code = 770-8)                                        

 

             IMM GRAN % (test code 1.10 %                                 



             = 0984798239)                                        

 

             LYMPH % (test code = 4.3 %                                  



             736-9)                                              

 

             MONO % (test code = 6.0 %                                  



             5905-5)                                             

 

             EOS % (test code = 0.0 %                                  



             713-8)                                              

 

             BASO % (test code = 0.6 %                                  



             706-2)                                              

 

             GRAN MAT x10^3(ANC) 5.77 10*3/uL 1.99-6.95                 



             (test code =                                        



             9245038752)                                         

 

             IMM GRAN x10^3 (test 0.07 10*3/uL 0-0.06       H            



             code = 1246797963)                                        

 

             LYMPH x10^3 (test 0.28 10*3/uL 1.09-3.23    L            



             code = 731-0)                                        

 

             MONO x10^3 (test code 0.39 10*3/uL 0.36-1.02                 



             = 742-7)                                            

 

             EOS x10^3 (test code              0.06-0.53    L            



             = 711-2)                                            

 

             BASO x10^3 (test code 0.04 10*3/uL 0.01-0.09                 



             = 704-7)                                            

 

             BANDS (test code = MARKED INCREASED              A            



             6439690868)                                         

 

             Lab Interpretation Abnormal                               



             (test code = 66483-8)                                        



[facility]:08





             Test Item    Value        Reference Range Interpretation Comments

 

             OSMOLALITY (test code =              See_Comment  H             [Au

tomated message]



             2692-2)                                             The system [employer]



                                                                 generated this 

result



                                                                 transmitted ref

erence



                                                                 range: 278 - 30

5



                                                                 mOsm/kg. The



                                                                 reference range

 was



                                                                 not used to int

erpret



                                                                 this result as



                                                                 normal/abnormal

.

 

             Lab Interpretation (test Abnormal                               



             code = 84045-2)                                        



[facility]





             Test Item    Value        Reference    Interpretation Comments



                                       Range                     

 

             TROPONIN I (test 0.144 ng/mL  See_Comment  H             [Automated



             code = 4979356605)                                        message] 

The



                                                                 system which



                                                                 generated this



                                                                 result



                                                                 transmitted



                                                                 reference range

:



                                                                 <=0.034. The



                                                                 reference range



                                                                 was not used to



                                                                 interpret this



                                                                 result as



                                                                 normal/abnormal

.

 

             KRYSTLE (test code = Reference (Normal)                           



             KRYSTLE)         Range (defined by                           



                          the 99th percentile                           



                          reference limit): <=                           



                          0.034 ng/mL Note:                           



                          Cardiac troponin                           



                          begins to rise 3-4                           



                          hours after the                           



                          onset of ischemia.                           



                          Repeat in 4-6 hours                           



                          if the sample was                           



                          drawn within 3-4                           



                          hours of the onset                           



                          of the symptom and                           



                          found normal.                           



                          Diagnosis of                           



                          myocardial injury is                           



                          made with acute                           



                          changes in cTn                           



                          concentrations with                           



                          at least one serial                           



                          sample above the                           



                          99th percentile                           



                          upper reference                           



                          limit (URL), taken                           



                          together with the                           



                          patient's clinical                           



                          presentation. Biotin                           



                          has been reported to                           



                          cause a negative                           



                          bias, interpret                           



                          results relative to                           



                          patient's use of                           



                          biotin.                                

 

             Lab Interpretation Abnormal                               



             (test code =                                        



             89460-8)                                            



[facility]16:04





             Test Item    Value        Reference Range Interpretation Comments

 

             NT-proBNP (test code 7640 pg/mL   See_Comment  H             [Autom

ated



             = 4654074639)                                        message] The



                                                                 system which



                                                                 generated this



                                                                 result



                                                                 transmitted



                                                                 reference range

:



                                                                 <=450. The



                                                                 reference range



                                                                 was not used to



                                                                 interpret this



                                                                 result as



                                                                 normal/abnormal

.

 

             KRYSTLE (test code = KRYSTLE) Biotin has been                           



                          reported to                            



                          cause a negative                           



                          bias, interpret                           



                          results relative                           



                          to patient's use                           



                          of biotin.                             

 

             Lab Interpretation Abnormal                               



             (test code = 70719-1)                                        



[facility] 
12:15:13





             Test Item    Value        Reference Range Interpretation Comments

 

             HGB A1C (test code = 5.5 %        4-5.7                     



             4548-4)                                             

 

             KRYSTLE (test code = KRYSTLE) Reference                              



                          RangesNormal:                           



                          <5.7%Prediabetes:                           



                          5.7 - 6.4%Diabetes:                           



                          > 6.5%                                 

 

             Lab Interpretation (test Normal                                 



             code = 61865-0)                                        



[facility] METABOLIC PANEL (NA, K, CL, CO2, 
GLUCOSE, BUN, CREATININE, CA)2022-10-10 12:07:24





             Test Item    Value        Reference Range Interpretation Comments

 

             NA (test code = 140 mmol/L   135-145                   



             4629097772)                                         

 

             K (test code = 4.5 mmol/L   3.5-5                     



             5723991522)                                         

 

             CL (test code = 101 mmol/L                       



             9271054676)                                         

 

             CO2 TOTAL (test code = 27 mmol/L    23-31                     



             5705747033)                                         

 

             AGAP (test code =              2-16                      



             9732583862)                                         

 

             BUN (test code = 74 mg/dL     7-23         H            



             1723015121)                                         

 

             GLUCOSE (test code = 129 mg/dL           H            



             2162115421)                                         

 

             CREATININE (test code = 3.47 mg/dL   0.6-1.25     H            



             2045375635)                                         

 

             CALCIUM (test code = 8.6 mg/dL    8.6-10.6                  



             1950245429)                                         

 

             eGFR (test code =              mL/min/1.73m2              



             4426934082)                                         

 

             KRYSTLE (test code = KRYSTLE) Association of                           



                          Glomerular Filtration                           



                          Rate (GFR) and Staging                           



                          of Kidney Disease*                           



                          +---------------------                           



                          --+-------------------                           



                          --+-------------------                           



                          ------+| GFR                           



                          (mL/min/1.73 m2) ?|                           



                          With Kidney Damage ?|                           



                          ?Without Kidney                           



                          Damage+---------------                           



                          --------+-------------                           



                          --------+-------------                           



                          ------------+| ?>90 ?                           



                          ? ? ? ? ? ? ? ?|                           



                          ?Stage one ? ? ? ? ?|                           



                          ? Normal ? ? ? ? ? ? ?                           



                          ?+--------------------                           



                          ---+------------------                           



                          ---+------------------                           



                          -------+| ?60-89 ? ? ?                           



                          ? ? ? ? ?| ?Stage two                           



                          ? ? ? ? ?| ? Decreased                           



                          GFR ? ? ? ?                            



                          +---------------------                           



                          --+-------------------                           



                          --+-------------------                           



                          ------+| ?30-59 ? ? ?                           



                          ? ? ? ? ?| ?Stage                           



                          three ? ? ? ?| ? Stage                           



                          three ? ? ? ? ?                           



                          +---------------------                           



                          --+-------------------                           



                          --+-------------------                           



                          ------+| ?15-29 ? ? ?                           



                          ? ? ? ? ?| ?Stage four                           



                          ? ? ? ? | ? Stage four                           



                          ? ? ? ? ?                              



                          ?+--------------------                           



                          ---+------------------                           



                          ---+------------------                           



                          -------+| ?<15 (or                           



                          dialysis) ? ?| ?Stage                           



                          five ? ? ? ? | ? Stage                           



                          five ? ? ? ? ?                           



                          ?+--------------------                           



                          ---+------------------                           



                          ---+------------------                           



                          -------+ *Each stage                           



                          assumes the associated                           



                          GFR level has been in                           



                          effect for at least                           



                          three months. ?Stages                           



                          1 to 5, with or                           



                          without kidney                           



                          disease, indicate                           



                          chronic kidney                           



                          disease. Notes:                           



                          Determination of                           



                          stages one and two                           



                          (with eGFR                             



                          >59mL/min/1.73 m2)                           



                          requires estimation of                           



                          kidney damage for at                           



                          least three months as                           



                          defined by structural                           



                          or functional                           



                          abnormalities of the                           



                          kidney, manifested by                           



                          either:Pathological                           



                          abnormalities or                           



                          Markers of kidney                           



                          damage (including                           



                          abnormalities in the                           



                          composition of the                           



                          blood or urine or                           



                          abnormalities in                           



                          imaging tests).                           

 

             Lab Interpretation Abnormal                               



             (test code = 73107-0)                                        



[facility]ALB,T.PRO,BILI
T,BU/BC,ALT,AST,ALK PHOS)2022-10-10 12:07:24





             Test Item    Value        Reference Range Interpretation Comments

 

             TOTAL BILI (test code = 4124799197) 0.8 mg/dL    0.1-1.1           

        

 

             BILI UNCON (test code = 1711786470) 0.1 mg/dL    0.1-1.1           

        

 

             BILI CONJ (test code = 7411075639) 0.0 mg/dL    0-0.3              

       

 

             T PROTEIN (test code = 0622659269) 6.3 g/dL     6.3-8.2            

       

 

             ALBUMIN (test code = 2756009149) 3.2 g/dL     3.5-5        L       

     

 

             ALK PHOS (test code = 1153090085) 79 U/L                     

      

 

             ALTv (test code = 1742-6) 20 U/L       5-50                      

 

             AST(SGOT) (test code = 4389497175) 23 U/L       13-40              

       

 

             Lab Interpretation (test code = Abnormal                           

    



             92652-7)                                            



[facility]





             Test Item    Value        Reference Range Interpretation Comments

 

             MAGNESIUM (test code = 5690796245) 2.6 mg/dL    1.7-2.4      H     

       

 

             Lab Interpretation (test code = Abnormal                           

    



             84582-1)                                            



[facility]





             Test Item    Value        Reference Range Interpretation Comments

 

             PHOSPHORUS (test code = 5243445617) 5.0 mg/dL    2.5-5             

        

 

             Lab Interpretation (test code = Normal                             

    



             40752-0)                                            



[facility] 11:33:10





             Test Item    Value        Reference Range Interpretation Comments

 

             PH (test code =              7.32-7.42                 



             1043321551)                                         

 

             PCO2 ABIGAIL (test code =              See_Comment  L             [Auto

mated



             5104605407)                                         message] The sy

stem



                                                                 which generated



                                                                 this result



                                                                 transmitted



                                                                 reference range

: 41



                                                                 - 51 mmHg. The



                                                                 reference range

 was



                                                                 not used to



                                                                 interpret this



                                                                 result as



                                                                 normal/abnormal

.

 

             PO2 ABIGAIL (test code =              See_Comment  HH            [Autom

ated



             8274786057)                                         message] The sy

stem



                                                                 which generated



                                                                 this result



                                                                 transmitted



                                                                 reference range

: 25



                                                                 - 40 mmHg. The



                                                                 reference range

 was



                                                                 not used to



                                                                 interpret this



                                                                 result as



                                                                 normal/abnormal

.

 

             HCO3 ABIGAIL (test code =              See_Comment                [Auto

mated



             5095441825)                                         message] The sy

stem



                                                                 which generated



                                                                 this result



                                                                 transmitted



                                                                 reference range

: 24



                                                                 - 28 mEq/L. The



                                                                 reference range

 was



                                                                 not used to



                                                                 interpret this



                                                                 result as



                                                                 normal/abnormal

.

 

             AC VBE(BEAKER) (test              mEq/L                     



             code = 9306814221)                                        

 

             LACTIC ACID (test code 1.77 mmol/L  0.5-2.2                   



             = 1514555752)                                        

 

             Lab Interpretation Abnormal                               



             (test code = 05428-3)                                        



[facility]





             Test Item    Value        Reference Range Interpretation Comments

 

             FERRITIN (test code = 121.0 ng/mL                      



             4709830787)                                         

 

             KRYSTLE (test code = KRYSTLE) Biotin has been                           



                          reported to cause a                           



                          negative bias,                           



                          interpret results                           



                          relative to                            



                          patient's use of                           



                          biotin.                                

 

             Lab Interpretation (test Normal                                 



             code = 18015-9)                                        



[facility] 06:31:37





             Test Item    Value        Reference Range Interpretation Comments

 

             TSH (test code =              See_Comment                [Automated

 message]



             9550671148)                                         The system whic

h



                                                                 generated this 

result



                                                                 transmitted ref

erence



                                                                 range: 0.45 - 4

.70



                                                                 mIU/L. The refe

rence



                                                                 range was not u

sed to



                                                                 interpret this 

result



                                                                 as normal/abnor

mal.

 

             Lab Interpretation (test Normal                                 



             code = 13991-2)                                        



[facility]





             Test Item    Value        Reference    Interpretation Comments



                                       Range                     

 

             TROPONIN I (test 0.164 ng/mL  See_Comment  H             [Automated



             code = 5407910643)                                        message] 

The



                                                                 system which



                                                                 generated this



                                                                 result



                                                                 transmitted



                                                                 reference range

:



                                                                 <=0.034. The



                                                                 reference range



                                                                 was not used to



                                                                 interpret this



                                                                 result as



                                                                 normal/abnormal

.

 

             KRYSTLE (test code = Reference (Normal)                           



             KRYSTLE)         Range (defined by                           



                          the 99th percentile                           



                          reference limit): <=                           



                          0.034 ng/mL Note:                           



                          Cardiac troponin                           



                          begins to rise 3-4                           



                          hours after the                           



                          onset of ischemia.                           



                          Repeat in 4-6 hours                           



                          if the sample was                           



                          drawn within 3-4                           



                          hours of the onset                           



                          of the symptom and                           



                          found normal.                           



                          Diagnosis of                           



                          myocardial injury is                           



                          made with acute                           



                          changes in cTn                           



                          concentrations with                           



                          at least one serial                           



                          sample above the                           



                          99th percentile                           



                          upper reference                           



                          limit (URL), taken                           



                          together with the                           



                          patient's clinical                           



                          presentation. Biotin                           



                          has been reported to                           



                          cause a negative                           



                          bias, interpret                           



                          results relative to                           



                          patient's use of                           



                          biotin.                                

 

             Lab Interpretation Abnormal                               



             (test code =                                        



             23836-6)                                            



[id number]33





             Test Item    Value        Reference Range Interpretation Comments

 

             IRON (test code = 1077600752) 17 ug/dL            L          

  

 

             TIBC (test code = 0236171614) 234 ug/dL    250-410      L          

  

 

             % FE SAT (test code = 7296235909) 7 %          20-50        L      

      

 

             Lab Interpretation (test code = Abnormal                           

    



             30473-5)                                            



[facility] METABOLIC PANEL (NA, K, CL, CO2, 
GLUCOSE, BUN, CREATININE, CA)2022-10-10 05:58:55





             Test Item    Value        Reference Range Interpretation Comments

 

             NA (test code = 140 mmol/L   135-145                   



             6197199891)                                         

 

             K (test code = 4.6 mmol/L   3.5-5                     



             5602508884)                                         

 

             CL (test code = 100 mmol/L                       



             2415002235)                                         

 

             CO2 TOTAL (test code = 27 mmol/L    23-31                     



             7081785038)                                         

 

             AGAP (test code =              2-16                      



             5725652237)                                         

 

             BUN (test code = 73 mg/dL     7-23         H            



             8447743237)                                         

 

             GLUCOSE (test code = 117 mg/dL           H            



             5288575850)                                         

 

             CREATININE (test code = 3.52 mg/dL   0.6-1.25     H            



             2757475662)                                         

 

             CALCIUM (test code = 8.6 mg/dL    8.6-10.6                  



             6542648480)                                         

 

             eGFR (test code =              mL/min/1.73m2              



             5472734000)                                         

 

             KRYSTLE (test code = KRYSTLE) Association of                           



                          Glomerular Filtration                           



                          Rate (GFR) and Staging                           



                          of Kidney Disease*                           



                          +---------------------                           



                          --+-------------------                           



                          --+-------------------                           



                          ------+| GFR                           



                          (mL/min/1.73 m2) ?|                           



                          With Kidney Damage ?|                           



                          ?Without Kidney                           



                          Damage+---------------                           



                          --------+-------------                           



                          --------+-------------                           



                          ------------+| ?>90 ?                           



                          ? ? ? ? ? ? ? ?|                           



                          ?Stage one ? ? ? ? ?|                           



                          ? Normal ? ? ? ? ? ? ?                           



                          ?+--------------------                           



                          ---+------------------                           



                          ---+------------------                           



                          -------+| ?60-89 ? ? ?                           



                          ? ? ? ? ?| ?Stage two                           



                          ? ? ? ? ?| ? Decreased                           



                          GFR ? ? ? ?                            



                          +---------------------                           



                          --+-------------------                           



                          --+-------------------                           



                          ------+| ?30-59 ? ? ?                           



                          ? ? ? ? ?| ?Stage                           



                          three ? ? ? ?| ? Stage                           



                          three ? ? ? ? ?                           



                          +---------------------                           



                          --+-------------------                           



                          --+-------------------                           



                          ------+| ?15-29 ? ? ?                           



                          ? ? ? ? ?| ?Stage four                           



                          ? ? ? ? | ? Stage four                           



                          ? ? ? ? ?                              



                          ?+--------------------                           



                          ---+------------------                           



                          ---+------------------                           



                          -------+| ?<15 (or                           



                          dialysis) ? ?| ?Stage                           



                          five ? ? ? ? | ? Stage                           



                          five ? ? ? ? ?                           



                          ?+--------------------                           



                          ---+------------------                           



                          ---+------------------                           



                          -------+ *Each stage                           



                          assumes the associated                           



                          GFR level has been in                           



                          effect for at least                           



                          three months. ?Stages                           



                          1 to 5, with or                           



                          without kidney                           



                          disease, indicate                           



                          chronic kidney                           



                          disease. Notes:                           



                          Determination of                           



                          stages one and two                           



                          (with eGFR                             



                          >59mL/min/1.73 m2)                           



                          requires estimation of                           



                          kidney damage for at                           



                          least three months as                           



                          defined by structural                           



                          or functional                           



                          abnormalities of the                           



                          kidney, manifested by                           



                          either:Pathological                           



                          abnormalities or                           



                          Markers of kidney                           



                          damage (including                           



                          abnormalities in the                           



                          composition of the                           



                          blood or urine or                           



                          abnormalities in                           



                          imaging tests).                           

 

             Lab Interpretation Abnormal                               



             (test code = 81402-9)                                        



[facility]95[facility]TOTAL CHOLESTEROL, 
TRIGLYCERIDES, HDL)2022-10-10 05:58:55





             Test Item    Value        Reference Range Interpretation Comments

 

             CHOL (test code = 109 mg/dL    120-200      L            



             0364100440)                                         

 

             HDL (test code = 20 mg/dL     See_Comment  L             [Automated

 message]



             6491088527)                                         The system [employer]



                                                                 generated this



                                                                 result transmit

batsheva



                                                                 reference range

:



                                                                 >=40. The refer

ence



                                                                 range was not u

sed



                                                                 to interpret th

is



                                                                 result as



                                                                 normal/abnormal

.

 

             HDLC RATIO (test code =              See_Comment  H             [Au

tomated message]



             0264238100)                                         The system [employer]



                                                                 generated this



                                                                 result transmit

batsheva



                                                                 reference range

:



                                                                 <=5.0. The refe

rence



                                                                 range was not u

sed



                                                                 to interpret th

is



                                                                 result as



                                                                 normal/abnormal

.

 

             TRIG (test code = 141 mg/dL                        



             4334964894)                                         

 

             LDL CHOL (test code = 61 mg/dL     See_Comment                [Auto

mated message]



             59054-6)                                            The system [employer]



                                                                 generated this



                                                                 result transmit

batsheva



                                                                 reference range

:



                                                                 <=160. The refe

rence



                                                                 range was not u

sed



                                                                 to interpret th

is



                                                                 result as



                                                                 normal/abnormal

.

 

             VLDL (test code = 28 mg/dL     5-60                      



             4820596165)                                         

 

             Lab Interpretation (test Abnormal                               



             code = 12112-2)                                        



[id number]





             Test Item    Value        Reference Range Interpretation Comments

 

             URIC ACID (test code = 2174174623) 9.8 mg/dL    3.6-8        H     

       

 

             Lab Interpretation (test code = Abnormal                           

    



             12230-1)                                            



[id number]57[facility]55





             Test Item    Value        Reference Range Interpretation Comments

 

             CK (test code = 2760920586) 121 U/L                          

 

             Lab Interpretation (test code = Normal                             

    



             51762-3)                                            



[facility] L[facility] 05:30:03





             Test Item    Value        Reference Range Interpretation Comments

 

             PH (test code = 2)              7.35-7.45    L            

 

             PCO2 (test code =              See_Comment  H             [Automate

d



             1919147990)                                         message] The sy

stem



                                                                 which generated



                                                                 this result



                                                                 transmitted



                                                                 reference range

: 35



                                                                 - 45 mmHg. The



                                                                 reference range

 was



                                                                 not used to



                                                                 interpret this



                                                                 result as



                                                                 normal/abnormal

.

 

             PO2 (test code =              See_Comment  H             [Automated



             5720950630)                                         message] The sy

stem



                                                                 which generated



                                                                 this result



                                                                 transmitted



                                                                 reference range

: 80



                                                                 - 100 mmHg. The



                                                                 reference range

 was



                                                                 not used to



                                                                 interpret this



                                                                 result as



                                                                 normal/abnormal

.

 

             HCO3 (test code =              See_Comment                [Automate

d



             6925599671)                                         message] The sy

stem



                                                                 which generated



                                                                 this result



                                                                 transmitted



                                                                 reference range

: 22



                                                                 - 26 mEq/L. The



                                                                 reference range

 was



                                                                 not used to



                                                                 interpret this



                                                                 result as



                                                                 normal/abnormal

.

 

             BE (test code =              See_Comment                [Automated



             8863531456)                                         message] The sy

stem



                                                                 which generated



                                                                 this result



                                                                 transmitted



                                                                 reference range

:



                                                                 -3.0 - 3.0 mEq/

L.



                                                                 The reference r

[place]



                                                                 was not used to



                                                                 interpret this



                                                                 result as



                                                                 normal/abnormal

.

 

             THB (test code = 14.0 g/dL    13.5-18                   



             9167294406)                                         

 

             %O2HB (test code = 97.7 %       94-99                     



             7774075523)                                         

 

             %COHB ART (test code = 0.7 %        0-1.5                     



             0717831766)                                         

 

             %METHB ART (test code = 0.2 %        0.4-1.5      L            



             9793226789)                                         

 

             VOL%O2 ART (test code = 19.4 %       15-23                     



             1501306704)                                         

 

             NA (test code = 137 mmol/L   135-145                   



             4413325193)                                         

 

             K+ (test code = 4.2 mmol/L   3.5-5                     



             6655164484)                                         

 

             AC CA IONZ (test code = 4.60 mg/dL   4.5-5.3                   



             2559312294)                                         

 

             GLUCOSE (test code = 119 mg/dL           H            



             4580227726)                                         

 

             LACTIC ACID (test code 1.78 mmol/L  0.5-2.2                   



             = 0692047602)                                        

 

             Lab Interpretation Abnormal                               



             (test code = 81883-2)                                        



[facility]





             Test Item    Value        Reference    Interpretation Comments



                                       Range                     

 

             TROPONIN I (test 0.254 ng/mL  See_Comment  H             [Automated



             code = 5597942175)                                        message] 

The



                                                                 system which



                                                                 generated this



                                                                 result



                                                                 transmitted



                                                                 reference range

:



                                                                 <=0.034. The



                                                                 reference range



                                                                 was not used to



                                                                 interpret this



                                                                 result as



                                                                 normal/abnormal

.

 

             KRYSTLE (test code = Reference (Normal)                           



             KRYSTLE)         Range (defined by                           



                          the 99th percentile                           



                          reference limit): <=                           



                          0.034 ng/mL Note:                           



                          Cardiac troponin                           



                          begins to rise 3-4                           



                          hours after the                           



                          onset of ischemia.                           



                          Repeat in 4-6 hours                           



                          if the sample was                           



                          drawn within 3-4                           



                          hours of the onset                           



                          of the symptom and                           



                          found normal.                           



                          Diagnosis of                           



                          myocardial injury is                           



                          made with acute                           



                          changes in cTn                           



                          concentrations with                           



                          at least one serial                           



                          sample above the                           



                          99th percentile                           



                          upper reference                           



                          limit (URL), taken                           



                          together with the                           



                          patient's clinical                           



                          presentation. Biotin                           



                          has been reported to                           



                          cause a negative                           



                          bias, interpret                           



                          results relative to                           



                          patient's use of                           



                          biotin.                                

 

             Lab Interpretation Abnormal                               



             (test code =                                        



             00053-1)                                            



[facility] 16:16:39





             Test Item    Value        Reference Range Interpretation Comments

 

             NT-proBNP (test code 85325 pg/mL  See_Comment  H             [Autom

ated



             = 6085624465)                                        message] The



                                                                 system which



                                                                 generated this



                                                                 result



                                                                 transmitted



                                                                 reference range

:



                                                                 <=450. The



                                                                 reference range



                                                                 was not used to



                                                                 interpret this



                                                                 result as



                                                                 normal/abnormal

.

 

             KRYSTLE (test code = KRYSTLE) Biotin has been                           



                          reported to                            



                          cause a negative                           



                          bias, interpret                           



                          results relative                           



                          to patient's use                           



                          of biotin.                             

 

             Lab Interpretation Abnormal                               



             (test code = 85595-2)                                        



[facility]. METABOLIC PANEL (26057)2022-10-09 
16:08:15





             Test Item    Value        Reference Range Interpretation Comments

 

             NA (test code = 140 mmol/L   135-145                   



             1309641805)                                         

 

             K (test code = 4.6 mmol/L   3.5-5                     



             8034935291)                                         

 

             CL (test code = 103 mmol/L                       



             5743773799)                                         

 

             CO2 TOTAL (test code = 23 mmol/L    23-31                     



             8123398843)                                         

 

             AGAP (test code =              2-16                      



             4275010034)                                         

 

             BUN (test code = 64 mg/dL     7-23         H            



             0222726644)                                         

 

             GLUCOSE (test code = 97 mg/dL                         



             2274235331)                                         

 

             CREATININE (test code = 3.95 mg/dL   0.6-1.25     H            



             0900308016)                                         

 

             TOTAL BILI (test code = 0.7 mg/dL    0.1-1.1                   



             8335470135)                                         

 

             CALCIUM (test code = 8.4 mg/dL    8.6-10.6     L            



             4837396554)                                         

 

             T PROTEIN (test code = 5.4 g/dL     6.3-8.2      L            



             2027474430)                                         

 

             ALBUMIN (test code = 2.9 g/dL     3.5-5        L            



             0400696849)                                         

 

             ALK PHOS (test code = 73 U/L                           



             8446756518)                                         

 

             ALTv (test code = 18 U/L       5-50                      



             1742-6)                                             

 

             AST(SGOT) (test code = 22 U/L       13-40                     



             9829653034)                                         

 

             eGFR (test code =              mL/min/1.73m2              



             4309918382)                                         

 

             KRYSTLE (test code = KRYSTLE) Association of                           



                          Glomerular Filtration                           



                          Rate (GFR) and Staging                           



                          of Kidney Disease*                           



                          +---------------------                           



                          --+-------------------                           



                          --+-------------------                           



                          ------+| GFR                           



                          (mL/min/1.73 m2) ?|                           



                          With Kidney Damage ?|                           



                          ?Without Kidney                           



                          Damage+---------------                           



                          --------+-------------                           



                          --------+-------------                           



                          ------------+| ?>90 ?                           



                          ? ? ? ? ? ? ? ?|                           



                          ?Stage one ? ? ? ? ?|                           



                          ? Normal ? ? ? ? ? ? ?                           



                          ?+--------------------                           



                          ---+------------------                           



                          ---+------------------                           



                          -------+| ?60-89 ? ? ?                           



                          ? ? ? ? ?| ?Stage two                           



                          ? ? ? ? ?| ? Decreased                           



                          GFR ? ? ? ?                            



                          +---------------------                           



                          --+-------------------                           



                          --+-------------------                           



                          ------+| ?30-59 ? ? ?                           



                          ? ? ? ? ?| ?Stage                           



                          three ? ? ? ?| ? Stage                           



                          three ? ? ? ? ?                           



                          +---------------------                           



                          --+-------------------                           



                          --+-------------------                           



                          ------+| ?15-29 ? ? ?                           



                          ? ? ? ? ?| ?Stage four                           



                          ? ? ? ? | ? Stage four                           



                          ? ? ? ? ?                              



                          ?+--------------------                           



                          ---+------------------                           



                          ---+------------------                           



                          -------+| ?<15 (or                           



                          dialysis) ? ?| ?Stage                           



                          five ? ? ? ? | ? Stage                           



                          five ? ? ? ? ?                           



                          ?+--------------------                           



                          ---+------------------                           



                          ---+------------------                           



                          -------+ *Each stage                           



                          assumes the associated                           



                          GFR level has been in                           



                          effect for at least                           



                          three months. ?Stages                           



                          1 to 5, with or                           



                          without kidney                           



                          disease, indicate                           



                          chronic kidney                           



                          disease. Notes:                           



                          Determination of                           



                          stages one and two                           



                          (with eGFR                             



                          >59mL/min/1.73 m2)                           



                          requires estimation of                           



                          kidney damage for at                           



                          least three months as                           



                          defined by structural                           



                          or functional                           



                          abnormalities of the                           



                          kidney, manifested by                           



                          either:Pathological                           



                          abnormalities or                           



                          Markers of kidney                           



                          damage (including                           



                          abnormalities in the                           



                          composition of the                           



                          blood or urine or                           



                          abnormalities in                           



                          imaging tests).                           

 

             Lab Interpretation Abnormal                               



             (test code = 05201-8)                                        



[facility]07[facility]55





             Test Item    Value        Reference Range Interpretation Comments

 

             LIPASE (test code = 2259469957) 12 U/L       0-220                 

    

 

             Lab Interpretation (test code = Normal                             

    



             55138-3)                                            



[facility]:03:24





             Test Item    Value        Reference Range Interpretation Comments

 

             WBC (test code =              See_Comment                [Automated



             6690-2)                                             message] The sy

stem



                                                                 which generated



                                                                 this result



                                                                 transmitted



                                                                 reference range

:



                                                                 4.20 - 10.70



                                                                 10*3/?L. The



                                                                 reference range

 was



                                                                 not used to



                                                                 interpret this



                                                                 result as



                                                                 normal/abnormal

.

 

             RBC (test code =              See_Comment  L             [Automated



             789-8)                                              message] The sy

stem



                                                                 which generated



                                                                 this result



                                                                 transmitted



                                                                 reference range

:



                                                                 4.26 - 5.52



                                                                 10*6/?L. The



                                                                 reference range

 was



                                                                 not used to



                                                                 interpret this



                                                                 result as



                                                                 normal/abnormal

.

 

             HGB (test code = 10.9 g/dL    12.2-16.4    L            



             718-7)                                              

 

             HCT (test code = 32.8 %       38.4-49.3    L            



             4544-3)                                             

 

             MCV (test code = 95.9 fL      81.7-95.6    H            



             787-2)                                              

 

             MCH (test code = 31.9 pg      26.1-32.7                 



             785-6)                                              

 

             MCHC (test code = 33.2 g/dL    31.2-35                   



             786-4)                                              

 

             RDW-SD (test code = 51.5 fL      38.5-51.6                 



             42172-3)                                            

 

             RDW-CV (test code = 14.6 %       12.1-15.4                 



             788-0)                                              

 

             PLT (test code =              See_Comment  H             [Automated



             777-3)                                              message] The sy

stem



                                                                 which generated



                                                                 this result



                                                                 transmitted



                                                                 reference range

:



                                                                 150 - 328 10*3/

?L.



                                                                 The reference r

wenceslao



                                                                 was not used to



                                                                 interpret this



                                                                 result as



                                                                 normal/abnormal

.

 

             MPV (test code = 10.9 fL      9.8-13                    



             78848-8)                                            

 

             NRBC/100 WBC (test              See_Comment                [Automat

ed



             code = 3595441777)                                        message] 

The system



                                                                 which generated



                                                                 this result



                                                                 transmitted



                                                                 reference range

:



                                                                 0.0 - 10.0 /100



                                                                 WBCs. The refer

ence



                                                                 range was not u

sed



                                                                 to interpret th

is



                                                                 result as



                                                                 normal/abnormal

.

 

             NRBC x10^3 (test code              See_Comment                [Auto

mated



             = [id number])                                        message] The s

ystem



                                                                 which generated



                                                                 this result



                                                                 transmitted



                                                                 reference range

:



                                                                 10*3/?L. The



                                                                 reference range

 was



                                                                 not used to



                                                                 interpret this



                                                                 result as



                                                                 normal/abnormal

.

 

             SEG % (test code = 31 %         33-76        L            



             77815-1)                                            

 

             BAND % (test code = 30 %         0-1          H            



             32713-3)                                            

 

             META % (test code = 1 %          See_Comment  H             [Automa

batsheva



             71790-9)                                            message] The sy

stem



                                                                 which generated



                                                                 this result



                                                                 transmitted



                                                                 reference range

:



                                                                 <=0. The refere

nce



                                                                 range was not u

sed



                                                                 to interpret th

is



                                                                 result as



                                                                 normal/abnormal

.

 

             LYMPH % (test code = 24 %         14-54                     



             92689-7)                                            

 

             MONO % (test code = 14 %         0-4          H            



             26485-3)                                            

 

             ANC (test code = 3.63 10*3/uL 1.99-6.95                 



             753-4)                                              

 

             DOHLE BODIES (test Present                   A            



             code = 7792-5)                                        

 

             TOXIC CHANGES (test Present                   A            



             code = 803-7)                                        

 

             PLT ESTIMATE (test Increased    Normal       A            



             code = 9317-9)                                        

 

             Lab Interpretation Abnormal                               



             (test code = 93692-4)                                        



[facility]





             Test Item    Value        Reference Range Interpretation Comments

 

             APTT Patient (test              See_Comment                [Automat

ed



             code = 3173-2)                                        message] The



                                                                 system which



                                                                 generated this



                                                                 result



                                                                 transmitted



                                                                 reference range

:



                                                                 23 - 38 Seconds

.



                                                                 The reference



                                                                 range was not



                                                                 used to interpr

et



                                                                 this result as



                                                                 normal/abnormal

.

 

             KRYSTLE (test code = KRYSTLE) The [facility] patient                           



                          population mean                           



                          normal value for                           



                          aPTT is 30                             



                          seconds.                               

 

             Lab Interpretation Normal                                 



             (test code = 02415-3)                                        



[facility]





             Test Item    Value        Reference Range Interpretation Comments

 

             PROTIME PATIENT (test              See_Comment                [Auto

mated message]



             code = 5964-2)                                        The system 

Sansan



                                                                 generated this 

result



                                                                 transmitted ref

erence



                                                                 range: 12.0 - 1

4.7



                                                                 Seconds. The re

ference



                                                                 range was not u

sed to



                                                                 interpret this 

result



                                                                 as normal/abnor

mal.

 

             INR (test code = 6301-6)                                        Nor

mal INR <1.1;



                                                                 Warfarin Therap

eutic



                                                                 range 2.0 to 3.

0 or



                                                                 2.5 to 3.5, dep

ending



                                                                 upon the indica

tions.

 

             Lab Interpretation (test Normal                                 



             code = 71631-1)                                        



[facility]

## 2022-10-23 NOTE — ER
Nurse's Notes                                                                                     

 [facility]                                                                 

Name: Rodney Xiao                                                                                

Age: 82 yrs                                                                                       

Sex: Male                                                                                         

: 1940                                                                                   

MRN: [id number]                                                                                   

Arrival Date: 10/23/2022                                                                          

Time: 16:16                                                                                       

Account#: [id number]                                                                            

Bed 7                                                                                             

Private MD:                                                                                       

Diagnosis: Displaced oblique fracture of shaft of humerus, left arm, initial encounter for open   

  fracture                                                                                        

                                                                                                  

Presentation:                                                                                     

10/23                                                                                             

16:20 Chief complaint: EMS states: Pt from [facility], closed fractured to L humerus on Sept ph  

      , has been in [facility] for rehab s/p pneumonia, today the nurse noticed blood on     

      dressing to L arm, noted to have obvious deformity, purple and green bruising, large        

      mount of swelling and a half dollar sized open area wound to bicep w/ what appears to       

      be clotted blood and adipose tissue being forced out d/t swelling. Unknown if pt had        

      fallen, "looked normal yesterday". VSS. Coronavirus screen: Vaccine status: Patient         

      reports receiving the 2nd dose of the covid vaccine. Ebola Screen: No symptoms or risks     

      identified at this time. Initial Sepsis Screen: Does the patient meet any 2 criteria?       

      No. Patient's initial sepsis screen is negative. Does the patient have a suspected          

      source of infection? Yes:. Risk Assessment: Do you want to hurt yourself or someone         

      else? Patient reports no desire to harm self or others. Onset of symptoms was 2022.                                                                                   

16:20 Method Of Arrival: EMS: [facility]  

16:20 Acuity: DIANA 2                                                                           ph  

                                                                                                  

Triage Assessment:                                                                                

17:07 General: Appears in no apparent distress. uncomfortable, Behavior is calm, cooperative. ph  

      Pain: Complains of pain in left arm. Neuro: Level of Consciousness is awake, alert,         

      obeys commands, Oriented to person, place, situation. Cardiovascular: Patient's skin is     

      warm and dry. Pulses are palpable in right radial artery and left radial artery.            

      Respiratory: Airway is patent Respiratory effort is even, unlabored, Respiratory            

      pattern is regular, symmetrical. Derm: Skin is fragile, is thin, Skin is pale, Wound        

      noted left bicep Wound is half dollar sized open area w/ blood clots and tissue oozing      

      out. Musculoskeletal: Circulation, motion, and sensation intact. Range of motion:           

      limited in lower extremities. Injury Description: Deformity sustained to left arm.          

                                                                                                  

Historical:                                                                                       

- Allergies:                                                                                      

17:06 No Known Allergies;                                                                     ph  

- PMHx:                                                                                           

17:06 Anxiety; CVA; Depression; Hyperlipidemia;                                               ph  

                                                                                                  

- Immunization history:: Adult Immunizations unknown.                                             

- Social history:: Smoking status: unknown.                                                       

- Family history:: not pertinent.                                                                 

- Hospitalizations: : No recent hospitalization is reported.                                      

                                                                                                  

                                                                                                  

Screenin:07 Abuse screen: Denies threats or abuse. Denies injuries from another. Nutritional        ph  

      screening: No deficits noted. Tuberculosis screening: No symptoms or risk factors           

      identified. Fall Risk Fall in past 12 months (25 points). Secondary diagnosis (15           

      points) CVA, IV access (20 points). Ambulatory Aid- None/Bed Rest/Nurse Assist (0 pts).     

      Gait- Impaired (20 pts.). Mental Status- Oriented to own ability (0 pts). Total Dempsey       

      Fall Scale indicates High Risk Score (45 or more points). Fall prevention measures have     

      been instituted. Side Rails Up X 2 Placed Close to Nursing Station Family Present and       

      informed to notify staff if the need to leave the bedside As available patient and          

      family educated on Fall Prevention Program and Strategies.                                  

                                                                                                  

Assessment:                                                                                       

17:11 General: SEE TRIAGE ASSESSMENT.                                                         ph  

18:35 Reassessment: Patient appears in no apparent distress at this time. Patient and/or      ph  

      family updated on plan of care and expected duration. Pain level reassessed. Report         

      called to [place] ED.                                                                       

19:00 Reassessment: Patient appears in no apparent distress at this time. Patient and/or      ph  

      family updated on plan of care and expected duration. Pain level reassessed. Awaiting       

      EMS for transfer to [facility].                                                       

19:55 General: report provided to [facility] ambulance service EMT.                                 aa9 

                                                                                                  

Vital Signs:                                                                                      

16:20  / 48; Pulse 67; Resp 18; Temp 97.6; Pulse Ox 95% on 2 lpm NC;                    ph  

16:22  / 57; Pulse 69; Resp 21; Pulse Ox 96% on 2 lpm NC;                               ld1 

17:32  / 55; Pulse 63; Resp 18; Pulse Ox 97% on 2 lpm NC;                               ld1 

18:01  / 55; Pulse 64; Resp 18; Pulse Ox 100% on 2 lpm NC;                              ph  

18:30  / 50; Pulse 69; Resp 18; Temp 97.1; Pulse Ox 100% on R/A;                        ph  

20:00  / 46; Pulse 62; Resp 17 S; Pulse Ox 100% on 2 lpm NC;                            aa9 

                                                                                                  

ED Course:                                                                                        

16:16 Patient arrived in ED.                                                                  eb  

16:18 Berlin Cueva MD is Attending Physician.                                                rn  

17:06 Triage completed.                                                                       ph  

17:09 Love Massey, RN is Primary Nurse.                                                    ph  

17:10 Arm band placed on Patient placed in an exam room, on a stretcher.                      ph  

17:10 Patient has correct armband on for positive identification. Placed in gown. Bed in low  ph  

      position. Call light in reach. Side rails up X2. Client placed on continuous cardiac        

      and pulse oximetry monitoring. NIBP monitoring applied. Door closed. Noise minimized.       

      Warm blanket given.                                                                         

17:10 Missed attempt(s): 22 gauge in right forearm. Bleeding controlled, band aid applied,    ph  

      catheter tip intact. Missed attempt(s): 22 gauge in right forearm. Bleeding controlled,     

      band aid applied, catheter tip intact.                                                      

17:29 Inserted saline lock: 20 gauge in right forearm, using aseptic technique.               ld1 

17:29 Type And Screen Sent.                                                                   ld1 

17:30 XRAY Humerus LEFT In Process Unspecified.                                               EDMS

17:32 SARS RAPID Sent.                                                                        ld1 

17:45 Dressings: Vaseline gauze X 1; left bicep. Ace wrap to L arm, original posterior long   ph  

      arm splint in place, wrspped w/ ace wrap.                                                   

17:55 initiated a transfer with Shirleen Oleary Rn from the [facility].   eb  

18:19 administrative approval given by Shirlene Oleary Rn/ patient has been accepted to         [facility] ER/ Dr. OMAR Garcia has accepted the patient in transfer without          

      conference with Dr. Cueva/ report to be called to [phone].                             

18:31 [facility] called/ they should be here around 2000/.                                eb  

20:10 No provider procedures requiring assistance completed. Patient transferred, IV remains  aa9 

      in place.                                                                                   

                                                                                                  

Administered Medications:                                                                         

17:31 Drug: NS 0.9% 1000 ml Route: IV; Rate: 1000 ml; Site: right forearm;                    ld1 

18:59 Follow up: Response: No adverse reaction; IV Status: Completed infusion; IV Intake:     ph  

      1000ml                                                                                      

17:35 Drug: Ancef (cefazolin) 2 grams Route: IVPB; Infused Over: 30 mins; Site: right         ph  

      antecubital;                                                                                

18:13 Follow up: Response: No adverse reaction; IV Status: Completed infusion                 ph  

17:37 Drug: morphine 2 mg Route: IVP; Infused Over: 4 mins; Site: right antecubital;          ph  

18:59 Follow up: Response: No adverse reaction                                                ph  

17:58 Drug: Zofran (Ondansetron) 4 mg Route: IVP; Site: right antecubital;                    ph  

18:59 Follow up: Response: No adverse reaction                                                ph  

18:50 Drug: Tetanus Toxoid,Adsorbed 0.5 ml {: Laguo. Exp: 2024. Lot ph  

      #: [id number]. } Route: IM; Site: right deltoid;                                                 

18:59 Follow up: Response: No adverse reaction                                                ph  

                                                                                                  

                                                                                                  

Medication:                                                                                       

19:01 Vaccine Information Statement (VIS) provided today. Questions and/or concerns           ph  

      addressed. VIS edition date: 2021.                                                

                                                                                                  

Intake:                                                                                           

18:59 IV: 1000ml; Total: 1000ml.                                                              ph  

                                                                                                  

Outcome:                                                                                          

17:31 ER care complete, transfer ordered by MD.                                               rn  

20:10 Transferred [facility] ambulance service. to [facility], Transfer form completed.   aa9 

20:10 Condition: stable                                                                           

20:10 Instructed on the need for transfer.                                                        

20:13 Patient left the ED.                                                                    aa9 

                                                                                                  

Signatures:                                                                                       

Dispatcher [employer]                           EDMS                                                 

Berlin Cueva MD MD rn Hall, Patricia, RN RN ph Botello, Elizabeth eb Dibbern, Lauren, RN                     RN   ld1                                                  

Emely Major RN                       RN   aa9                                                  

                                                                                                  

**************************************************************************************************

## 2022-10-23 NOTE — RAD REPORT
EXAM DESCRIPTION:  RAD - Humerus Left - 10/23/2022 5:29 pm

 

CLINICAL HISTORY:  DEFORMITY

 

COMPARISON:  Shoulder  Left 2 View dated 11/18/2021

 

FINDINGS/IMPRESSION:  Remote left proximal humerus fracture. Displaced, angulated, and foreshortened 
left mid humerus fracture which demonstrates over 11 cm of foreshortening. The fracture lines are les
s sharp than expected for an acute fracture. The fracture may be subacute.